# Patient Record
Sex: FEMALE | Race: WHITE | Employment: OTHER | URBAN - NONMETROPOLITAN AREA
[De-identification: names, ages, dates, MRNs, and addresses within clinical notes are randomized per-mention and may not be internally consistent; named-entity substitution may affect disease eponyms.]

---

## 2018-01-03 DIAGNOSIS — H93.19 TINNITUS, UNSPECIFIED LATERALITY: Primary | ICD-10-CM

## 2018-01-03 DIAGNOSIS — H92.01 RIGHT EAR PAIN: ICD-10-CM

## 2018-01-04 ENCOUNTER — OFFICE VISIT (OUTPATIENT)
Dept: ENT CLINIC | Age: 53
End: 2018-01-04
Payer: COMMERCIAL

## 2018-01-04 ENCOUNTER — HOSPITAL ENCOUNTER (OUTPATIENT)
Dept: AUDIOLOGY | Age: 53
Discharge: HOME | End: 2018-01-04
Payer: COMMERCIAL

## 2018-01-04 VITALS
DIASTOLIC BLOOD PRESSURE: 80 MMHG | BODY MASS INDEX: 17.23 KG/M2 | TEMPERATURE: 98.3 F | HEART RATE: 76 BPM | WEIGHT: 113.7 LBS | SYSTOLIC BLOOD PRESSURE: 102 MMHG | RESPIRATION RATE: 20 BRPM | HEIGHT: 68 IN

## 2018-01-04 DIAGNOSIS — H93.19 TINNITUS, UNSPECIFIED LATERALITY: Primary | ICD-10-CM

## 2018-01-04 PROCEDURE — 92557 COMPREHENSIVE HEARING TEST: CPT | Performed by: AUDIOLOGIST

## 2018-01-04 PROCEDURE — 92567 TYMPANOMETRY: CPT | Performed by: AUDIOLOGIST

## 2018-01-04 PROCEDURE — 99203 OFFICE O/P NEW LOW 30 MIN: CPT | Performed by: OTOLARYNGOLOGY

## 2018-01-04 ASSESSMENT — ENCOUNTER SYMPTOMS
BACK PAIN: 0
VOICE CHANGE: 0
CONSTIPATION: 0
BLOOD IN STOOL: 0
PHOTOPHOBIA: 0
WHEEZING: 0
ANAL BLEEDING: 0
RHINORRHEA: 0
EYE REDNESS: 0
STRIDOR: 0
TROUBLE SWALLOWING: 0
ABDOMINAL DISTENTION: 0
FACIAL SWELLING: 0
DIARRHEA: 0
EYE DISCHARGE: 0
COLOR CHANGE: 0
SINUS PRESSURE: 0
COUGH: 0
APNEA: 0
NAUSEA: 0
SORE THROAT: 0
RECTAL PAIN: 0
CHOKING: 0
SHORTNESS OF BREATH: 0
VOMITING: 0
EYE PAIN: 0
EYE ITCHING: 0
CHEST TIGHTNESS: 0
ABDOMINAL PAIN: 0

## 2020-08-26 ENCOUNTER — HOSPITAL ENCOUNTER (OUTPATIENT)
Dept: WOMENS IMAGING | Age: 55
Discharge: HOME | End: 2020-08-26

## 2020-09-01 ENCOUNTER — NURSE ONLY (OUTPATIENT)
Dept: LAB | Age: 55
End: 2020-09-01

## 2020-09-08 ENCOUNTER — OFFICE VISIT (OUTPATIENT)
Dept: ONCOLOGY | Age: 55
End: 2020-09-08
Payer: COMMERCIAL

## 2020-09-08 ENCOUNTER — HOSPITAL ENCOUNTER (OUTPATIENT)
Dept: INFUSION THERAPY | Age: 55
Discharge: HOME | End: 2020-09-08
Payer: COMMERCIAL

## 2020-09-08 VITALS
HEART RATE: 69 BPM | TEMPERATURE: 97.5 F | WEIGHT: 112.6 LBS | RESPIRATION RATE: 18 BRPM | SYSTOLIC BLOOD PRESSURE: 138 MMHG | OXYGEN SATURATION: 98 % | DIASTOLIC BLOOD PRESSURE: 67 MMHG | HEIGHT: 68 IN | BODY MASS INDEX: 17.06 KG/M2

## 2020-09-08 PROBLEM — Z51.11 ENCOUNTER FOR CHEMOTHERAPY MANAGEMENT: Status: ACTIVE | Noted: 2020-09-08

## 2020-09-08 PROBLEM — C50.411 MALIGNANT NEOPLASM OF UPPER-OUTER QUADRANT OF RIGHT FEMALE BREAST (HCC): Status: ACTIVE | Noted: 2020-09-08

## 2020-09-08 PROCEDURE — 99211 OFF/OP EST MAY X REQ PHY/QHP: CPT

## 2020-09-08 PROCEDURE — 99205 OFFICE O/P NEW HI 60 MIN: CPT | Performed by: INTERNAL MEDICINE

## 2020-09-08 RX ORDER — SUCRALFATE 1 G/1
1 TABLET ORAL 2 TIMES DAILY
COMMUNITY

## 2020-09-08 RX ORDER — OMEPRAZOLE 20 MG/1
20 CAPSULE, DELAYED RELEASE ORAL DAILY
COMMUNITY

## 2020-09-08 RX ORDER — ESCITALOPRAM OXALATE 10 MG/1
10 TABLET ORAL DAILY
COMMUNITY

## 2020-09-08 NOTE — PATIENT INSTRUCTIONS
1.  Consult Dr. Renetta Mora for Mediport placement for chemotherapy administration. Dr. Renetta Mora plans to do this Thursday at [facility]. 2.  Schedule chemotherapy teaching for Adriamycin/Cytoxan followed by Taxol. 3.  Pre-CERT chemotherapy treatment. 4.  Schedule first cycle of chemotherapy with laboratory studies on 09/16/2020.  5.  Return to clinic to see me on second cycle of chemotherapy treatment with labs. 6.  Schedule cardiac echo and MRI of the liver at [facility] in [facility]. These are not critical to be done prior to the first cycle of chemotherapy.

## 2020-09-09 RX ORDER — ONDANSETRON 4 MG/1
4 TABLET, FILM COATED ORAL EVERY 8 HOURS PRN
Qty: 30 TABLET | Refills: 5 | Status: SHIPPED | OUTPATIENT
Start: 2020-09-09 | End: 2021-06-18 | Stop reason: ALTCHOICE

## 2020-09-09 RX ORDER — LIDOCAINE AND PRILOCAINE 25; 25 MG/G; MG/G
CREAM TOPICAL
Qty: 1 TUBE | Refills: 5 | Status: SHIPPED | OUTPATIENT
Start: 2020-09-09

## 2020-09-09 NOTE — PROGRESS NOTES
New chemotherapy validation note:    Diagnosis for chemotherapy: Breast Ca    Regimen ordered: [facility] [place] [facility] Taxol    Reference or literature used for validation: NCCN     Date literature or guideline last updated 2019     Deviation from literature or guideline used: none    Summary of any verbal or telephone information obtained: n/a     Dean MARCUM. Ph.  9/9/2020  1:33 PM

## 2020-09-09 NOTE — PROGRESS NOTES
[facility] CENTER  [facility] NET[facility] OF [facility]  [facility] [address]  [address]  [address]  Dept: [phone]  Dept Fax: [phone]: [phone]     Referring Physician:  Dr. Tasia Bonner,    Thank you for your referral of this patient for evaluation of breast cancer. I appreciate your  trust of my care for your patients. I know that you know this patient's history well, but I will summarize for my records. In addition, my recommendations and plan of care are summarized below. Please call if you have any questions or if I can be of any further assistance to you or this patient. Julio Man:      Chief Complaint:  Francisca Sanchez is a 47 y. o. with breast cancer. HPI:  This is the first visit to the [facility] for this patient who was referred Dr. Shayla Bonner for evaluation of breast cancer. The patient is a 60-year-old postmenopausal female with breast cancer located in the right breast.  She was originally diagnosed with breast cancer in December 2009 also involving the right breast.  At that time she was noted to have an invasive ductal carcinoma that was estrogen receptor positive, progesterone separate positive, and HER-2/natty overexpression was negative. She underwent a lumpectomy of the right breast with sentinel node biopsy. Pathology confirmed the invasive ductal carcinoma and one sentinel lymph node was negative for malignancy. Her Oncotype DX score was 18 and therefore she did not receive adjuvant chemotherapy treatment. At the time of her initial diagnosis, she was premenopausal and therefore was placed on tamoxifen therapy. She completed a 5-year course of anti-estrogen therapy. In August 2020, the patient developed a palpable right axillary mass.   In context of this condition, the patient had a mammogram and ultrasound completed on August 11, 2020. This confirmed a 2.5 cm right axillary mass with a 1.5 cm adjacent lymph node. Other than the palpable abnormality the patient had no signs or symptoms associated with the mass or other evidence of breast cancer. A modifying factor affecting this condition is that on August 18, 2020 the patient had a biopsy of the axillary mass completed. This confirmed invasive ductal carcinoma which was as receptor positive, progestin receptor positive and HER-2/natty overexpression was negative by FISH. The Ki-67 was 30%. She was then seen by Dr. Harrison Sylvester at [facility]. CT scans of the chest abdomen and pelvis were completed as well as a bone scan. These did not find any definitive evidence of distant metastatic disease. There were several cyst and a hemangioma noted in the liver. On September 20 of 2020 a follow-up breast MRI was completed which found a much larger mass than previously noted which abutted and may involve the lateral and posterior borders of the pectoralis minor muscle. No left axillary or internal mammary chain lymphadenopathy was identified. The patient was reviewed at the breast cancer multimodality conference at [facility] and received a recommendation of receiving neoadjuvant chemotherapy with dose dense Adriamycin/Cytoxan followed by a taxane. The patient would like to have her treatment done closer to home as she lives in [facility] [place] [place] and therefore was referred to our office for chemotherapy administration. She still plans to have her surgical intervention completed at [facility]. Past Medical History  She  has a past medical history of Cancer ([address] and GERD (gastroesophageal reflux disease). Surgical History  She  has a past surgical history that includes Rotator cuff repair (Left); Appendectomy; and Breast lumpectomy (Right, 01/19/2010).     Home Medications  She has a current medication list which includes the following prescription(s): sucralfate, omeprazole, and escitalopram.    Allergies  Allergies   Allergen Reactions    Compazine [Prochlorperazine] Other (See Comments)     Had trouble swallowing      Social History  She  reports that she has never smoked. She has never used smokeless tobacco. She reports current alcohol use. She reports that she does not use drugs. Family History  Her family history includes Cancer in her mother. Review of Systems  Constitutional: Negative. HENT: Negative. Eyes: Negative. Respiratory: Negative. Cardiovascular: Negative. Gastrointestinal: Negative. Genitourinary: Negative. Musculoskeletal: Negative. Skin: Negative. Neurological: Negative. Hematological: Negative. Psychiatric/Behavioral: Negative. Objective:   Physical Exam  Vitals:    09/08/20 1231   BP: 138/67   Pulse: 69   Resp: 18   Temp: 97.5 °F (36.4 °C)   SpO2: 98%   Vitals reviewed and are stable. Constitutional: Well-developed and well-nourished. No acute distress. HENT: Normocephalic and atraumatic. Eyes: Pupils are equal and reactive. No scleral icterus. Neck: Overall appearance is symmetrical. No identifiable masses. Chest: Inspection and palpation of chest is normal.  Pulmonary: Effort normal. No respiratory distress. Cardiovascular: RRR. No edema in any of the four extremities. Abdominal: Soft. No hepatomegaly or splenomegaly. Musculoskeletal: Gait is normal. Muscle strength and tone good. Neurological: Alert and oriented to person, place, and time. Judgment and thought content normal.  Skin: Skin is warm and dry. No rash. Psychiatric: Mood and affect appropriate for the clinical situation. Behavior is normal.      Assessment:   1. Recurrent invasive ductal carcinoma of the right breast.  2.  Probable cyst and hemangioma involving the liver. 3.  Initial chemotherapy encounter. Recommendations:   1. Consult Dr. Wen Dewey for Mediport placement for chemotherapy administration.     2.  Schedule chemotherapy teaching for Adriamycin/Cytoxan followed by Taxol. 3.  Pre-CERT chemotherapy treatment for the above treatment. 4.  Schedule first cycle of chemotherapy with laboratory studies on 09/16/2020.  5.  Return to clinic to see me on second cycle of chemotherapy treatment with labs. 6.  Schedule cardiac echo and MRI of the [place] [facility] [place] [address] [place] [address]. Daisha Barbosa M.D. Medical Director: [facility]  Cancer Network [address] [employer], [address] [place] [address], [address] [place] [address], [address] [place] [address], [address] [place] [address] of the [facility] at the [facility]      **This report has been created using voice recognition software. It may contain minor errors which are inherent in voice recognition technology. **

## 2020-09-10 RX ORDER — HEPARIN SODIUM (PORCINE) LOCK FLUSH IV SOLN 100 UNIT/ML 100 UNIT/ML
500 SOLUTION INTRAVENOUS PRN
Status: CANCELLED | OUTPATIENT
Start: 2020-09-10

## 2020-09-10 RX ORDER — SODIUM CHLORIDE 0.9 % (FLUSH) 0.9 %
20 SYRINGE (ML) INJECTION PRN
Status: CANCELLED | OUTPATIENT
Start: 2020-09-10

## 2020-09-10 RX ORDER — SODIUM CHLORIDE 0.9 % (FLUSH) 0.9 %
10 SYRINGE (ML) INJECTION PRN
Status: CANCELLED | OUTPATIENT
Start: 2020-09-10

## 2020-09-11 RX ORDER — METHYLPREDNISOLONE SODIUM SUCCINATE 125 MG/2ML
125 INJECTION, POWDER, LYOPHILIZED, FOR SOLUTION INTRAMUSCULAR; INTRAVENOUS ONCE
Status: CANCELLED | OUTPATIENT
Start: 2020-09-16

## 2020-09-11 RX ORDER — SODIUM CHLORIDE 9 MG/ML
INJECTION, SOLUTION INTRAVENOUS CONTINUOUS
Status: CANCELLED | OUTPATIENT
Start: 2020-09-16

## 2020-09-11 RX ORDER — SODIUM CHLORIDE 0.9 % (FLUSH) 0.9 %
5 SYRINGE (ML) INJECTION PRN
Status: CANCELLED | OUTPATIENT
Start: 2020-09-16

## 2020-09-11 RX ORDER — DIPHENHYDRAMINE HYDROCHLORIDE 50 MG/ML
50 INJECTION INTRAMUSCULAR; INTRAVENOUS ONCE
Status: CANCELLED | OUTPATIENT
Start: 2020-09-16

## 2020-09-11 RX ORDER — HEPARIN SODIUM (PORCINE) LOCK FLUSH IV SOLN 100 UNIT/ML 100 UNIT/ML
500 SOLUTION INTRAVENOUS PRN
Status: CANCELLED | OUTPATIENT
Start: 2020-09-16

## 2020-09-11 RX ORDER — SODIUM CHLORIDE 9 MG/ML
20 INJECTION, SOLUTION INTRAVENOUS ONCE
Status: CANCELLED | OUTPATIENT
Start: 2020-09-16

## 2020-09-11 RX ORDER — SODIUM CHLORIDE 0.9 % (FLUSH) 0.9 %
10 SYRINGE (ML) INJECTION PRN
Status: CANCELLED | OUTPATIENT
Start: 2020-09-16

## 2020-09-11 RX ORDER — DOXORUBICIN HYDROCHLORIDE 2 MG/ML
60 INJECTION, SOLUTION INTRAVENOUS ONCE
Status: CANCELLED | OUTPATIENT
Start: 2020-09-16

## 2020-09-11 RX ORDER — PALONOSETRON 0.05 MG/ML
0.25 INJECTION, SOLUTION INTRAVENOUS ONCE
Status: CANCELLED | OUTPATIENT
Start: 2020-09-16

## 2020-09-14 ENCOUNTER — HOSPITAL ENCOUNTER (OUTPATIENT)
Dept: INFUSION THERAPY | Age: 55
Discharge: HOME | End: 2020-09-14
Payer: COMMERCIAL

## 2020-09-14 PROCEDURE — 99212 OFFICE O/P EST SF 10 MIN: CPT

## 2020-09-14 PROCEDURE — 99213 OFFICE O/P EST LOW 20 MIN: CPT

## 2020-09-14 NOTE — PROGRESS NOTES
STEVEN LEE here to see patient regaurding rash under left side of chest. Patient instructed to use only hydrocortisone cream, allow to dry after showering and to call if worse.

## 2020-09-14 NOTE — PLAN OF CARE
Problem: Musculor/Skeletal Functional Status  Goal: Absence of falls  Outcome: Met This Shift  Note: No falls this admission   Intervention: Fall precautions  Note: Patient aware of fall precautions for here and at home -call light in reach while here       Problem: Intellectual/Education/Knowledge Deficit  Goal: Teaching initiated upon admission  Outcome: Met This Shift  Note: Chemotherapy Teaching     What is Chemotherapy   Drug action [x]   Method of Administration [x]   Handouts given []     Side Effects  Nausea/vomiting [x]   Diarrhea [x]   Fatigue [x]   Signs / Symptoms of infection [x]   Neutropenia [x]   Thrombocytopenia [x]   Alopecia [x]   neuropathy [x]   Cumming diet &  the importance of fluids [x]       Micellaneous  Importance of nutrition [x]   Importance of oral hygiene [x]   When to call the MD [x]   Monitoring labs [x]   Use of supportive services []     Explanation of Drug Regimen / Frequency  Dose dense Adriamycin and cytoxin, neulasta and then taxol weekly      Comments  Verbalized understanding to drug,action,side effects and when to call MD      Goal: Written Disposition Instruction form completed  Outcome: Met This Shift  Note: Discharge instructions given and reviewed with patient. All questions answered. Patient verbalized understanding   Intervention: Verbal/written education provided  Note: Discharge home      Problem: Discharge Planning  Goal: Knowledge of discharge instructions  Description: Knowledge of discharge instructions  Outcome: Met This Shift  Note: Discharge instructions given and reviewed with patient. All questions answered. Patient verbalized understanding   Intervention: Interaction with patient/family and care team  Note: All questions and concerns addressed with patient   Intervention: Discharge to appropriate level of care  Note: Discharge home    Care plan reviewed with patient and .   Patient and   verbalize understanding of the plan of care and contribute to goal setting.

## 2020-09-14 NOTE — PROGRESS NOTES
Patient and  instructed on dose dense adriamycin and cytoxin and then week taxol and Pre-medications. Drug information sheets, side effects handouts, Understanding your blood counts, bland diet, importance of hydration, when to call physician sheet, reduce risk infection sheet, bleeding precaution, neutropenia precaution. All questions answered satisfactorily and support given. Patient given a tour of facility. Approximately 90  minutes spent with patient and .

## 2020-09-14 NOTE — PROGRESS NOTES
Pt discharged in stable condition with verbalization of discharge instructions all questions answered and all  belongings sent with patient.  Patient informed that will call with start date once insurance approval is approved

## 2020-09-16 ENCOUNTER — HOSPITAL ENCOUNTER (OUTPATIENT)
Dept: INFUSION THERAPY | Age: 55
Discharge: HOME | End: 2020-09-16
Payer: COMMERCIAL

## 2020-09-16 VITALS
DIASTOLIC BLOOD PRESSURE: 58 MMHG | OXYGEN SATURATION: 100 % | SYSTOLIC BLOOD PRESSURE: 106 MMHG | RESPIRATION RATE: 18 BRPM | HEIGHT: 68 IN | BODY MASS INDEX: 17.19 KG/M2 | WEIGHT: 113.4 LBS | TEMPERATURE: 97.4 F | HEART RATE: 61 BPM

## 2020-09-16 DIAGNOSIS — C50.411 MALIGNANT NEOPLASM OF UPPER-OUTER QUADRANT OF RIGHT FEMALE BREAST, UNSPECIFIED ESTROGEN RECEPTOR STATUS (HCC): ICD-10-CM

## 2020-09-16 DIAGNOSIS — Z17.0 MALIGNANT NEOPLASM OF UPPER-OUTER QUADRANT OF RIGHT BREAST IN FEMALE, ESTROGEN RECEPTOR POSITIVE (HCC): Primary | ICD-10-CM

## 2020-09-16 DIAGNOSIS — C50.411 MALIGNANT NEOPLASM OF UPPER-OUTER QUADRANT OF RIGHT BREAST IN FEMALE, ESTROGEN RECEPTOR POSITIVE (HCC): Primary | ICD-10-CM

## 2020-09-16 DIAGNOSIS — Z51.11 ENCOUNTER FOR CHEMOTHERAPY MANAGEMENT: ICD-10-CM

## 2020-09-16 LAB
ABSOLUTE IMMATURE GRANULOCYTE: 0.02 THOU/MM3 (ref 0–0.07)
BASINOPHIL, AUTOMATED: 0 % (ref 0–3)
BASOPHILS ABSOLUTE: 0 THOU/MM3 (ref 0–0.1)
CHLORIDE, WHOLE BLOOD: 104 MEQ/L (ref 98–109)
CREATININE, WHOLE BLOOD: 0.7 MG/DL (ref 0.5–1.2)
EOSINOPHILS ABSOLUTE: 0.1 THOU/MM3 (ref 0–0.4)
EOSINOPHILS RELATIVE PERCENT: 1 % (ref 0–4)
GFR, ESTIMATED ,CON: > 90 ML/MIN/1.73M2
GLUCOSE, WHOLE BLOOD: 85 MG/DL (ref 70–108)
HCT VFR BLD CALC: 39.4 % (ref 37–47)
HEMOGLOBIN: 13 GM/DL (ref 12–16)
IMMATURE GRANULOCYTES: 0 %
IONIZED CALCIUM, WHOLE BLOOD: 1.19 MMOL/L (ref 1.12–1.32)
LYMPHOCYTES # BLD: 37 % (ref 15–47)
LYMPHOCYTES ABSOLUTE: 2.3 THOU/MM3 (ref 1–4.8)
MCH RBC QN AUTO: 31 PG (ref 26–33)
MCHC RBC AUTO-ENTMCNC: 33 GM/DL (ref 32.2–35.5)
MCV RBC AUTO: 94 FL (ref 81–99)
MONOCYTES ABSOLUTE: 0.6 THOU/MM3 (ref 0.4–1.3)
MONOCYTES: 9 % (ref 0–12)
PDW BLD-RTO: 12.3 % (ref 11.5–14.5)
PLATELET # BLD: 283 THOU/MM3 (ref 130–400)
PMV BLD AUTO: 9.2 FL (ref 9.4–12.4)
POTASSIUM, WHOLE BLOOD: 4 MEQ/L (ref 3.5–4.9)
RBC # BLD: 4.2 MILL/MM3 (ref 4.2–5.4)
SEG NEUTROPHILS: 52 % (ref 43–75)
SEGMENTED NEUTROPHILS ABSOLUTE COUNT: 3.2 THOU/MM3 (ref 1.8–7.7)
SODIUM, WHOLE BLOOD: 142 MEQ/L (ref 138–146)
WBC # BLD: 6.2 THOU/MM3 (ref 4.8–10.8)

## 2020-09-16 PROCEDURE — 96411 CHEMO IV PUSH ADDL DRUG: CPT

## 2020-09-16 PROCEDURE — 96377 APPLICATON ON-BODY INJECTOR: CPT

## 2020-09-16 PROCEDURE — 6360000002 HC RX W HCPCS: Performed by: INTERNAL MEDICINE

## 2020-09-16 PROCEDURE — 36591 DRAW BLOOD OFF VENOUS DEVICE: CPT

## 2020-09-16 PROCEDURE — 96413 CHEMO IV INFUSION 1 HR: CPT

## 2020-09-16 PROCEDURE — 80076 HEPATIC FUNCTION PANEL: CPT

## 2020-09-16 PROCEDURE — 96375 TX/PRO/DX INJ NEW DRUG ADDON: CPT

## 2020-09-16 PROCEDURE — 96367 TX/PROPH/DG ADDL SEQ IV INF: CPT

## 2020-09-16 PROCEDURE — 84520 ASSAY OF UREA NITROGEN: CPT

## 2020-09-16 PROCEDURE — 2580000003 HC RX 258: Performed by: INTERNAL MEDICINE

## 2020-09-16 PROCEDURE — 82374 ASSAY BLOOD CARBON DIOXIDE: CPT

## 2020-09-16 PROCEDURE — 85025 COMPLETE CBC W/AUTO DIFF WBC: CPT

## 2020-09-16 PROCEDURE — 80047 BASIC METABLC PNL IONIZED CA: CPT

## 2020-09-16 RX ORDER — HEPARIN SODIUM (PORCINE) LOCK FLUSH IV SOLN 100 UNIT/ML 100 UNIT/ML
500 SOLUTION INTRAVENOUS PRN
Status: DISCONTINUED | OUTPATIENT
Start: 2020-09-16 | End: 2020-09-17 | Stop reason: HOSPADM

## 2020-09-16 RX ORDER — DOXORUBICIN HYDROCHLORIDE 2 MG/ML
60 INJECTION, SOLUTION INTRAVENOUS ONCE
Status: COMPLETED | OUTPATIENT
Start: 2020-09-16 | End: 2020-09-16

## 2020-09-16 RX ORDER — SODIUM CHLORIDE 9 MG/ML
20 INJECTION, SOLUTION INTRAVENOUS ONCE
Status: COMPLETED | OUTPATIENT
Start: 2020-09-16 | End: 2020-09-16

## 2020-09-16 RX ORDER — PALONOSETRON 0.05 MG/ML
0.25 INJECTION, SOLUTION INTRAVENOUS ONCE
Status: COMPLETED | OUTPATIENT
Start: 2020-09-16 | End: 2020-09-16

## 2020-09-16 RX ORDER — SODIUM CHLORIDE 0.9 % (FLUSH) 0.9 %
10 SYRINGE (ML) INJECTION PRN
Status: DISCONTINUED | OUTPATIENT
Start: 2020-09-16 | End: 2020-09-17 | Stop reason: HOSPADM

## 2020-09-16 RX ADMIN — Medication 10 ML: at 12:10

## 2020-09-16 RX ADMIN — FOSAPREPITANT 150 MG: 150 INJECTION, POWDER, LYOPHILIZED, FOR SOLUTION INTRAVENOUS at 12:42

## 2020-09-16 RX ADMIN — Medication 500 UNITS: at 14:46

## 2020-09-16 RX ADMIN — PALONOSETRON 0.25 MG: 0.05 INJECTION, SOLUTION INTRAVENOUS at 12:14

## 2020-09-16 RX ADMIN — DOXORUBICIN HYDROCHLORIDE 94 MG: 2 INJECTION, SOLUTION INTRAVENOUS at 13:18

## 2020-09-16 RX ADMIN — CYCLOPHOSPHAMIDE 940 MG: 1 INJECTION, POWDER, FOR SOLUTION INTRAVENOUS; ORAL at 13:40

## 2020-09-16 RX ADMIN — Medication 10 ML: at 11:45

## 2020-09-16 RX ADMIN — DEXAMETHASONE SODIUM PHOSPHATE 12 MG: 4 INJECTION, SOLUTION INTRAMUSCULAR; INTRAVENOUS at 12:19

## 2020-09-16 RX ADMIN — SODIUM CHLORIDE 20 ML/HR: 9 INJECTION, SOLUTION INTRAVENOUS at 12:10

## 2020-09-16 RX ADMIN — PEGFILGRASTIM 6 MG: KIT SUBCUTANEOUS at 14:46

## 2020-09-16 RX ADMIN — Medication 10 ML: at 14:46

## 2020-09-16 NOTE — PROGRESS NOTES
Adriamycin  given iv sidearm over 19 minutes into rapidly running iv of normal saline and good blood return through out.  Line flushed with normal saline

## 2020-09-16 NOTE — PLAN OF CARE
Problem: Infection - Central Venous Catheter-Associated Bloodstream Infection:  Goal: Will show no infection signs and symptoms  Description: Will show no infection signs and symptoms  Outcome: Met This Shift  Note: Mediport site with no redness or warmth. Skin over port intact with no signs of breakdown noted. Patient verbalizes signs/symptoms of port infection and when to notify the physician. Intervention: Infection risk assessment  Note: Discussed port maintenance, infection prevention, signs and when to call the doctor     Problem: Musculor/Skeletal Functional Status  Goal: Absence of falls  Outcome: Met This Shift  Note: Patient verbalizes understanding of fall precautions. Patient free from falls this visit. Intervention: Fall precautions  Note: Discussed fall precautions, call light within reach. Problem: Intellectual/Education/Knowledge Deficit  Goal: Teaching initiated upon admission  Outcome: Met This Shift  Note: Patient verbalizes understanding to verbal information given on adriamycin, cytoxan, and neulasta on pro,action and possible side effects. Aware to call MD if develop complications.    Intervention: Verbal/written education provided  Note: Chemotherapy Teaching     What is Chemotherapy   Drug action [x]   Method of Administration [x]   Handouts given []     Side Effects  Nausea/vomiting [x]   Diarrhea [x]   Fatigue [x]   Signs / Symptoms of infection [x]   Neutropenia [x]   Thrombocytopenia [x]   Alopecia [x]   neuropathy [x]   Stephens diet &  the importance of fluids [x]       Micellaneous  Importance of nutrition [x]   Importance of oral hygiene [x]   When to call the MD [x]   Monitoring labs [x]   Use of supportive services []     Explanation of Drug Regimen / Frequency  Adriamycin, cytoxan, and neulasta on pro     Comments  Verbalized understanding to drug,action,side effects and when to call MD        Problem: Discharge Planning  Goal: Knowledge of discharge instructions  Description: Knowledge of discharge instructions  Outcome: Met This Shift  Note: Verbalized understanding of discharge instructions, follow-up appointments, and when to call the physician. Intervention: Discharge to appropriate level of care  Note: Discuss discharge instructions, follow ups, and when to call the doctor. Care plan reviewed with patient and family. Patient and family verbalize understanding of the plan of care and contribute to goal setting.

## 2020-09-16 NOTE — PROGRESS NOTES
After approximately 27 hours, your On-body injector will beep to let you know your dose delivery will begin in 2 minutes. When the dose delivery starts it will take about 45 minutes to complete. During this time, the On-body injector will flash a green light. You may hear a series of clicks, this is Okay. A beep will sound when the dose delivery is complete. Check to see the status light is Solid Green or has switched off, this means dose is complete. Grab edge of adhesive pad. Slowly peel off the On-body injector and look for black line next to EMPTY indicator. Place into plastic disposable container. If during the administration of drug, the adhesive becomes wet or you notice dripping - call physician immediately.

## 2020-09-16 NOTE — PROGRESS NOTES
Chemotherapy Administration    Pre-assessment Data: Antineoplastic Agents  Other:   See toxicity flow sheet for assessment [x]     Physician Notification of Concerns Related to Chemotherapy Administration:   Physician Notified Fernando Chadwick / Time of Notification      Interventions:   Lab work assessed  [x]   Height / Weight verified for dose [x]   Current MAR reviewed [x]   Emergency drugs available as appropriate [x]   Anaphylaxis assessment completed [x]   Pre-medications administered as ordered [x]   Blood return noted upon initiation of chemotherapy [x]   Blood return noted each 1-2ml of a vesicant medication if given IV push [x]   Blood return noted each 2-3ml of a non-vesicant medication if given IV push []   Monitor for signs / symptoms of hypersensitivity reaction [x]   Chemotherapy orders (drug/dose/rate) verified by 2 Chemo certified RNs [x]   Monitor IV site and blood return throughout the infusion of the medication [x]   Document IV site checks on the IV assessment form [x]   Document chemotherapy teaching on the Patient Education tab [x]   Document patient verbalizes understanding of medications being administered [x]   If IV infiltration, see ONS Guidelines []   Other:      []

## 2020-09-16 NOTE — PROGRESS NOTES
Patient assessed for the following post chemotherapy:    Dizziness   No  Lightheadedness  No      Acute nausea/vomiting No  Headache   No  Chest pain/pressure  No  Rash/itching   No  Shortness of breath  No    Patient kept for 20 minutes observation post infusion chemotherapy. Patient tolerated chemotherapy treatment with adriamycin and cytoxin without any complications. Last vital signs:   BP (!) 106/58   Pulse 61   Temp 97.4 °F (36.3 °C) (Oral)   Resp 18   Ht 5' 8\" (1.727 m)   Wt 113 lb 6.4 oz (51.4 kg)   SpO2 100%   BMI 17.24 kg/m²       Patient instructed if experience any of the above symptoms following today's infusion,he is to notify MD immediately or go to the emergency department. Discharge instructions given to patient. Verbalizes understanding. Ambulated off unit with  with belongings.

## 2020-09-17 LAB
ALBUMIN SERPL-MCNC: 4.4 G/DL (ref 3.5–5.1)
ALP BLD-CCNC: 62 U/L (ref 38–126)
ALT SERPL-CCNC: 15 U/L (ref 11–66)
AST SERPL-CCNC: 25 U/L (ref 5–40)
BILIRUB SERPL-MCNC: 0.3 MG/DL (ref 0.3–1.2)
BILIRUBIN DIRECT: < 0.2 MG/DL (ref 0–0.3)
BUN BLDV-MCNC: 13 MG/DL (ref 7–22)
CO2: 28 MEQ/L (ref 23–33)
TOTAL PROTEIN: 7.3 G/DL (ref 6.1–8)

## 2020-09-29 RX ORDER — DIPHENHYDRAMINE HYDROCHLORIDE 50 MG/ML
50 INJECTION INTRAMUSCULAR; INTRAVENOUS ONCE
Status: CANCELLED | OUTPATIENT
Start: 2020-09-30

## 2020-09-29 RX ORDER — SODIUM CHLORIDE 9 MG/ML
INJECTION, SOLUTION INTRAVENOUS CONTINUOUS
Status: CANCELLED | OUTPATIENT
Start: 2020-09-30

## 2020-09-29 RX ORDER — METHYLPREDNISOLONE SODIUM SUCCINATE 125 MG/2ML
125 INJECTION, POWDER, LYOPHILIZED, FOR SOLUTION INTRAMUSCULAR; INTRAVENOUS ONCE
Status: CANCELLED | OUTPATIENT
Start: 2020-09-30

## 2020-09-29 RX ORDER — SODIUM CHLORIDE 0.9 % (FLUSH) 0.9 %
5 SYRINGE (ML) INJECTION PRN
Status: CANCELLED | OUTPATIENT
Start: 2020-09-30

## 2020-09-30 ENCOUNTER — HOSPITAL ENCOUNTER (OUTPATIENT)
Dept: INFUSION THERAPY | Age: 55
Discharge: HOME | End: 2020-09-30
Payer: COMMERCIAL

## 2020-09-30 ENCOUNTER — OFFICE VISIT (OUTPATIENT)
Dept: ONCOLOGY | Age: 55
End: 2020-09-30
Payer: COMMERCIAL

## 2020-09-30 VITALS
OXYGEN SATURATION: 99 % | WEIGHT: 115 LBS | SYSTOLIC BLOOD PRESSURE: 113 MMHG | BODY MASS INDEX: 17.49 KG/M2 | HEART RATE: 77 BPM | DIASTOLIC BLOOD PRESSURE: 68 MMHG | RESPIRATION RATE: 18 BRPM

## 2020-09-30 VITALS
OXYGEN SATURATION: 100 % | TEMPERATURE: 97.6 F | DIASTOLIC BLOOD PRESSURE: 55 MMHG | WEIGHT: 115 LBS | HEART RATE: 65 BPM | RESPIRATION RATE: 18 BRPM | BODY MASS INDEX: 17.43 KG/M2 | HEIGHT: 68 IN | SYSTOLIC BLOOD PRESSURE: 113 MMHG

## 2020-09-30 DIAGNOSIS — Z51.11 ENCOUNTER FOR CHEMOTHERAPY MANAGEMENT: ICD-10-CM

## 2020-09-30 DIAGNOSIS — C50.411 MALIGNANT NEOPLASM OF UPPER-OUTER QUADRANT OF RIGHT BREAST IN FEMALE, ESTROGEN RECEPTOR POSITIVE (HCC): Primary | ICD-10-CM

## 2020-09-30 DIAGNOSIS — Z17.0 MALIGNANT NEOPLASM OF UPPER-OUTER QUADRANT OF RIGHT BREAST IN FEMALE, ESTROGEN RECEPTOR POSITIVE (HCC): Primary | ICD-10-CM

## 2020-09-30 DIAGNOSIS — C50.411 MALIGNANT NEOPLASM OF UPPER-OUTER QUADRANT OF RIGHT FEMALE BREAST, UNSPECIFIED ESTROGEN RECEPTOR STATUS (HCC): ICD-10-CM

## 2020-09-30 LAB
CHLORIDE, WHOLE BLOOD: 104 MEQ/L (ref 98–109)
CREATININE, WHOLE BLOOD: 0.7 MG/DL (ref 0.5–1.2)
GFR, ESTIMATED ,CON: > 90 ML/MIN/1.73M2
GLUCOSE, WHOLE BLOOD: 81 MG/DL (ref 70–108)
IONIZED CALCIUM, WHOLE BLOOD: 1.15 MMOL/L (ref 1.12–1.32)
POTASSIUM, WHOLE BLOOD: 4.2 MEQ/L (ref 3.5–4.9)
SODIUM, WHOLE BLOOD: 141 MEQ/L (ref 138–146)

## 2020-09-30 PROCEDURE — 82374 ASSAY BLOOD CARBON DIOXIDE: CPT

## 2020-09-30 PROCEDURE — 99211 OFF/OP EST MAY X REQ PHY/QHP: CPT

## 2020-09-30 PROCEDURE — 2580000003 HC RX 258: Performed by: INTERNAL MEDICINE

## 2020-09-30 PROCEDURE — 99215 OFFICE O/P EST HI 40 MIN: CPT | Performed by: INTERNAL MEDICINE

## 2020-09-30 PROCEDURE — 96375 TX/PRO/DX INJ NEW DRUG ADDON: CPT

## 2020-09-30 PROCEDURE — 85025 COMPLETE CBC W/AUTO DIFF WBC: CPT

## 2020-09-30 PROCEDURE — 36591 DRAW BLOOD OFF VENOUS DEVICE: CPT

## 2020-09-30 PROCEDURE — 96367 TX/PROPH/DG ADDL SEQ IV INF: CPT

## 2020-09-30 PROCEDURE — 96411 CHEMO IV PUSH ADDL DRUG: CPT

## 2020-09-30 PROCEDURE — 80047 BASIC METABLC PNL IONIZED CA: CPT

## 2020-09-30 PROCEDURE — 96377 APPLICATON ON-BODY INJECTOR: CPT

## 2020-09-30 PROCEDURE — 84520 ASSAY OF UREA NITROGEN: CPT

## 2020-09-30 PROCEDURE — 80076 HEPATIC FUNCTION PANEL: CPT

## 2020-09-30 PROCEDURE — 96413 CHEMO IV INFUSION 1 HR: CPT

## 2020-09-30 PROCEDURE — 6360000002 HC RX W HCPCS: Performed by: INTERNAL MEDICINE

## 2020-09-30 RX ORDER — SODIUM CHLORIDE 0.9 % (FLUSH) 0.9 %
10 SYRINGE (ML) INJECTION PRN
Status: DISCONTINUED | OUTPATIENT
Start: 2020-09-30 | End: 2020-10-01 | Stop reason: HOSPADM

## 2020-09-30 RX ORDER — SODIUM CHLORIDE 9 MG/ML
20 INJECTION, SOLUTION INTRAVENOUS ONCE
Status: COMPLETED | OUTPATIENT
Start: 2020-09-30 | End: 2020-09-30

## 2020-09-30 RX ORDER — PROMETHAZINE HYDROCHLORIDE 25 MG/1
25 TABLET ORAL EVERY 6 HOURS PRN
Qty: 30 TABLET | Refills: 3 | Status: SHIPPED | OUTPATIENT
Start: 2020-09-30 | End: 2021-06-18 | Stop reason: ALTCHOICE

## 2020-09-30 RX ORDER — DOXORUBICIN HYDROCHLORIDE 2 MG/ML
60 INJECTION, SOLUTION INTRAVENOUS ONCE
Status: COMPLETED | OUTPATIENT
Start: 2020-09-30 | End: 2020-09-30

## 2020-09-30 RX ORDER — PALONOSETRON 0.05 MG/ML
0.25 INJECTION, SOLUTION INTRAVENOUS ONCE
Status: COMPLETED | OUTPATIENT
Start: 2020-09-30 | End: 2020-09-30

## 2020-09-30 RX ORDER — PROMETHAZINE HYDROCHLORIDE 25 MG/1
25 TABLET ORAL EVERY 6 HOURS PRN
Qty: 30 TABLET | Refills: 3 | Status: SHIPPED | OUTPATIENT
Start: 2020-09-30 | End: 2020-09-30 | Stop reason: SDUPTHER

## 2020-09-30 RX ORDER — HEPARIN SODIUM (PORCINE) LOCK FLUSH IV SOLN 100 UNIT/ML 100 UNIT/ML
500 SOLUTION INTRAVENOUS PRN
Status: DISCONTINUED | OUTPATIENT
Start: 2020-09-30 | End: 2020-10-01 | Stop reason: HOSPADM

## 2020-09-30 RX ADMIN — DOXORUBICIN HYDROCHLORIDE 94 MG: 2 INJECTION, SOLUTION INTRAVENOUS at 13:21

## 2020-09-30 RX ADMIN — PEGFILGRASTIM 6 MG: KIT SUBCUTANEOUS at 14:19

## 2020-09-30 RX ADMIN — FOSAPREPITANT 150 MG: 150 INJECTION, POWDER, LYOPHILIZED, FOR SOLUTION INTRAVENOUS at 12:38

## 2020-09-30 RX ADMIN — PALONOSETRON 0.25 MG: 0.05 INJECTION, SOLUTION INTRAVENOUS at 12:14

## 2020-09-30 RX ADMIN — Medication 10 ML: at 12:11

## 2020-09-30 RX ADMIN — SODIUM CHLORIDE 20 ML/HR: 9 INJECTION, SOLUTION INTRAVENOUS at 12:11

## 2020-09-30 RX ADMIN — CYCLOPHOSPHAMIDE 940 MG: 1 INJECTION, POWDER, FOR SOLUTION INTRAVENOUS; ORAL at 13:38

## 2020-09-30 RX ADMIN — Medication 500 UNITS: at 14:35

## 2020-09-30 RX ADMIN — Medication 10 ML: at 14:35

## 2020-09-30 RX ADMIN — Medication 10 ML: at 11:30

## 2020-09-30 RX ADMIN — DEXAMETHASONE SODIUM PHOSPHATE 12 MG: 4 INJECTION, SOLUTION INTRAMUSCULAR; INTRAVENOUS at 12:17

## 2020-09-30 NOTE — PLAN OF CARE
Care plan reviewed with patient. Patient verbalized understanding of the plan of care and contribute to goal setting. Problem: Intellectual/Education/Knowledge Deficit  Goal: Teaching initiated upon admission  Outcome: Met This Shift  Note: Patient verbalizes understanding to verbal information given on cytoxan and adriamycin,action and possible side effects. Aware to call MD if develop complications. Intervention: Verbal/written education provided  Note: Chemotherapy Teaching     What is Chemotherapy   Drug action [x]   Method of Administration [x]   Handouts given []     Side Effects  Nausea/vomiting [x]   Diarrhea [x]   Fatigue [x]   Signs / Symptoms of infection [x]   Neutropenia [x]   Thrombocytopenia [x]   Alopecia [x]   neuropathy [x]   Chaplin diet &  the importance of fluids [x]       Micellaneous  Importance of nutrition [x]   Importance of oral hygiene [x]   When to call the MD [x]   Monitoring labs [x]   Use of supportive services []     Explanation of Drug Regimen / Frequency  Adriamycin and cytoxan     Comments  Verbalized understanding to drug,action,side effects and when to call MD         Problem: Discharge Planning  Goal: Knowledge of discharge instructions  Description: Knowledge of discharge instructions  Outcome: Met This Shift  Note: Verbalized understanding of discharge instructions, follow ups and when to call doctor   Intervention: Discharge to appropriate level of care  Note: Discuss discharge instructions, follow ups and when to call doctor. Problem: Falls - Risk of:  Goal: Will remain free from falls  Description: Will remain free from falls  Outcome: Met This Shift  Note: Free from falls while in infusion center.  Verbalized understanding of fall prevention and to ask for assistance with ambulation   Intervention: Assess risk factors for falls  Description: Assess risk factors for falls  Note: Assess for fall risk, instruct to ask for assistance with ambulation      Problem: Infection - Central Venous Catheter-Associated Bloodstream Infection:  Goal: Will show no infection signs and symptoms  Description: Will show no infection signs and symptoms  Outcome: Met This Shift  Note: Mediport site with no redness or warmth. Skin over port intact with no signs of breakdown noted. Patient verbalizes signs/symptoms of port infection and when to notify the physician.     Intervention: Infection risk assessment  Description: Infection risk assessment  Note: Discuss port maintenance, infection prevention, signs and when to call

## 2020-09-30 NOTE — PROGRESS NOTES
Chemotherapy Administration    Pre-assessment Data: Antineoplastic Agents  Other:   See toxicity flow sheet for assessment [x]     Physician Notification of Concerns Related to Chemotherapy Administration:   Physician Notified Marina Espinoza / Time of Notification      Interventions:   Lab work assessed  [x]   Height / Weight verified for dose [x]   Current MAR reviewed [x]   Emergency drugs available as appropriate [x]   Anaphylaxis assessment completed [x]   Pre-medications administered as ordered [x]   Blood return noted upon initiation of chemotherapy [x]   Blood return noted each 1-2ml of a vesicant medication if given IV push [x]   Blood return noted each 2-3ml of a non-vesicant medication if given IV push []   Monitor for signs / symptoms of hypersensitivity reaction [x]   Chemotherapy orders (drug/dose/rate) verified by 2 Chemo certified RNs [x]   Monitor IV site and blood return throughout the infusion of the medication [x]   Document IV site checks on the IV assessment form [x]   Document chemotherapy teaching on the Patient Education tab [x]   Document patient verbalizes understanding of medications being administered [x]   If IV infiltration, see ONS Guidelines []   Other:      []

## 2020-09-30 NOTE — PROGRESS NOTES
[facility] CANCER CENTER  CANCER NETWORK OF [facility]  [facility] [address] [facility]  [address]  [address]  Dept: [phone]  Dept Fax: [phone]: [phone]     Referring Physician:  Dr. Tasia Alberto    Subjective:      Chief Complaint:  Francisca Sanchez is a 54 y.o. with breast cancer. The patient is a 60-year-old postmenopausal female with breast cancer located in the right breast.  She was originally diagnosed with breast cancer in December 2009 also involving the right breast.  At that time she was noted to have an invasive ductal carcinoma that was estrogen receptor positive, progesterone separate positive, and HER-2/natty overexpression was negative. She underwent a lumpectomy of the right breast with sentinel node biopsy. Pathology confirmed the invasive ductal carcinoma and one sentinel lymph node was negative for malignancy. Her Oncotype DX score was 18 and therefore she did not receive adjuvant chemotherapy treatment. At the time of her initial diagnosis, she was premenopausal and therefore was placed on tamoxifen therapy. She completed a 5-year course of anti-estrogen therapy. In August 2020, the patient developed a palpable right axillary mass. In context of this condition, the patient had a mammogram and ultrasound completed on August 11, 2020. This confirmed a 2.5 cm right axillary mass with a 1.5 cm adjacent lymph node. Other than the palpable abnormality the patient had no signs or symptoms associated with the mass or other evidence of breast cancer. A modifying factor affecting this condition is that on August 18, 2020 the patient had a biopsy of the axillary mass completed. This confirmed invasive ductal carcinoma which was as receptor positive, progestin receptor positive and HER-2/natty overexpression was negative by FISH. The Ki-67 was 30%.   She was then seen by Dr. Shayla Bonner at [facility]. CT scans of the chest abdomen and pelvis were completed as well as a bone scan. These did not find any definitive evidence of distant metastatic disease. There were several cyst and a hemangioma noted in the liver. On September 20 of 2020 a follow-up breast MRI was completed which found a much larger mass than previously noted which abutted and may involve the lateral and posterior borders of the pectoralis minor muscle. No left axillary or internal mammary chain lymphadenopathy was identified. The patient was reviewed at the breast cancer multimodality conference at [facility] and received a recommendation of receiving neoadjuvant chemotherapy with dose dense Adriamycin/Cytoxan followed by a taxane. HPI:   Susana Alonso returns today for follow-up regarding her history of breast cancer and consideration of further chemotherapy treatment. Two weeks ago she received her first dose of Adriamycin Cytoxan therapy. Overall she tolerated this fairly well without any severe side effects or toxicity. The patient did develop a headache that she relates to the use of Zofran for nausea. Therefore, we will discontinue use of Zofran and will try Phenergan for antinausea therapy at home. She did experience a couple of days of malaise and anorexia but did not have any severe nausea or vomiting. The patient used on pro-for Neulasta injection which she tolerated well. She did not develop any specific aches or muscle cramping related to the use of Neulasta. Susana Alonso has not had fever, cough, shortness of breath or other signs of infection. Both her bowel and bladder habits have been fairly stable. Her white blood cell count is elevated today which is most likely related to the use of Neulasta. She does not have any specific signs of infection. The patient denies skeletal pain. She is active and remains with an ECOG performance status level 0.   The patient did have an MRI of the abdomen completed to evaluate liver lesions. These lesions just appeared to be cyst and hemangiomas with no clear evidence of malignancy. The results were reviewed with the patient today. A cardiac echo was also obtained for pre chemotherapy evaluation and her left ventricular ejection fraction was 60%. This was also reviewed with the patient today. PMH, SH, and FH:  I reviewed the patients medication list and allergy list as noted on the electronic medical record. The PMH, SH and FH were also reviewed as noted on the EMR. Review of Systems  Constitutional: Negative. HENT: Negative. Eyes: Negative. Respiratory: Negative. Cardiovascular: Negative. Gastrointestinal: Nausea. Genitourinary: Negative. Musculoskeletal: Negative. Skin: Negative. Neurological: Headaches. Hematological: Negative. Psychiatric/Behavioral: Negative. Objective:   Physical Exam  Vitals:    09/30/20 1216   BP: (!) 113/55   Pulse: 65   Resp: 18   Temp: 97.6 °F (36.4 °C)   SpO2: 100%   Vitals reviewed and are stable. Constitutional: Well-developed and well-nourished. No acute distress. HENT: Normocephalic and atraumatic. Eyes: Pupils are equal and reactive. No scleral icterus. Neck: Overall appearance is symmetrical. No identifiable masses. Pulmonary: Effort normal. No respiratory distress. Cardiovascular: RRR. No edema in any of the four extremities. Musculoskeletal: Gait is normal. Muscle strength and tone good. Neurological: Alert and oriented to person, place, and time. Judgment and thought content normal.  Skin: Skin is warm and dry. No rash. Psychiatric: Mood and affect appropriate for the clinical situation. Behavior is normal.      Assessment:   1. Recurrent invasive ductal carcinoma of the right breast.  2.  Leukocytosis. 3.  Oral Phenergan for nausea control at home. 4.  Chemotherapy encounter. Recommendations:   1. Proceed with cycle #2 of Adriamycin and Cytoxan combination chemotherapy.   2.  Monitor total WBC count and for signs of infection/fever. 3.  Monitor for side effects and toxicity from chemotherapy. 4.  Monitor for response of malignancy. Carlitos Mike M.D. Medical Director: [facility]  Cancer [place] [address], [address] [place] [address], [address] [place] [address], [address] [place] [address] of the [facility] at [facility]      **This report has been created using voice recognition software. It may contain minor errors which are inherent in voice recognition technology. **

## 2020-09-30 NOTE — PROGRESS NOTES
Patient assessed for the following post chemotherapy:    Dizziness   No  Lightheadedness  No      Acute nausea/vomiting No  Headache   No  Chest pain/pressure  No  Rash/itching   No  Shortness of breath  No    Patient kept for 20 minutes observation post infusion chemotherapy. Patient tolerated chemotherapy treatment with adriamycin and cytoxin  without any complications. Last vital signs:   /68   Pulse 77   Resp 18   Wt 115 lb (52.2 kg)   SpO2 99%   BMI 17.49 kg/m²     Patient instructed if experience any of the above symptoms following today's infusion,she is to notify MD immediately or go to the emergency department. Discharge instructions given to patient. Verbalizes understanding. Ambulated off unit with   with belongings.

## 2020-09-30 NOTE — PROGRESS NOTES
5fu given iv sidearm over 15 minutes into rapidly running iv of normal saline and good blood return through out.  Line flushed with normal saline

## 2020-10-01 LAB
ALBUMIN SERPL-MCNC: 4.3 G/DL (ref 3.5–5.1)
ALP BLD-CCNC: 102 U/L (ref 38–126)
ALT SERPL-CCNC: 18 U/L (ref 11–66)
AST SERPL-CCNC: 22 U/L (ref 5–40)
BASOPHILS # BLD: 0.3 %
BASOPHILS ABSOLUTE: 0.1 THOU/MM3 (ref 0–0.1)
BILIRUB SERPL-MCNC: < 0.2 MG/DL (ref 0.3–1.2)
BILIRUBIN DIRECT: < 0.2 MG/DL (ref 0–0.3)
BUN BLDV-MCNC: 13 MG/DL (ref 7–22)
CO2: 25 MEQ/L (ref 23–33)
EOSINOPHIL # BLD: 0.1 %
EOSINOPHILS ABSOLUTE: 0 THOU/MM3 (ref 0–0.4)
ERYTHROCYTE [DISTWIDTH] IN BLOOD BY AUTOMATED COUNT: 12.4 % (ref 11.5–14.5)
ERYTHROCYTE [DISTWIDTH] IN BLOOD BY AUTOMATED COUNT: 45.9 FL (ref 35–45)
HCT VFR BLD CALC: 39.7 % (ref 37–47)
HEMOGLOBIN: 12.4 GM/DL (ref 12–16)
IMMATURE GRANS (ABS): 3.28 THOU/MM3 (ref 0–0.07)
IMMATURE GRANULOCYTES: 16.2 %
LYMPHOCYTES # BLD: 13.1 %
LYMPHOCYTES ABSOLUTE: 2.6 THOU/MM3 (ref 1–4.8)
MCH RBC QN AUTO: 31.6 PG (ref 26–33)
MCHC RBC AUTO-ENTMCNC: 31.2 GM/DL (ref 32.2–35.5)
MCV RBC AUTO: 101 FL (ref 81–99)
MONOCYTES # BLD: 9.7 %
MONOCYTES ABSOLUTE: 2 THOU/MM3 (ref 0.4–1.3)
NUCLEATED RED BLOOD CELLS: 0 /100 WBC
PATHOLOGIST REVIEW: ABNORMAL
PLATELET # BLD: 237 THOU/MM3 (ref 130–400)
PMV BLD AUTO: 10.1 FL (ref 9.4–12.4)
RBC # BLD: 3.93 MILL/MM3 (ref 4.2–5.4)
SEG NEUTROPHILS: 60.6 %
SEGMENTED NEUTROPHILS ABSOLUTE COUNT: 12.2 THOU/MM3 (ref 1.8–7.7)
TOTAL PROTEIN: 7.1 G/DL (ref 6.1–8)
WBC # BLD: 20.2 THOU/MM3 (ref 4.8–10.8)

## 2020-10-13 RX ORDER — SODIUM CHLORIDE 9 MG/ML
INJECTION, SOLUTION INTRAVENOUS CONTINUOUS
Status: CANCELLED | OUTPATIENT
Start: 2020-10-14

## 2020-10-13 RX ORDER — DIPHENHYDRAMINE HYDROCHLORIDE 50 MG/ML
50 INJECTION INTRAMUSCULAR; INTRAVENOUS ONCE
Status: CANCELLED | OUTPATIENT
Start: 2020-10-14

## 2020-10-13 RX ORDER — SODIUM CHLORIDE 0.9 % (FLUSH) 0.9 %
5 SYRINGE (ML) INJECTION PRN
Status: CANCELLED | OUTPATIENT
Start: 2020-10-14

## 2020-10-13 RX ORDER — METHYLPREDNISOLONE SODIUM SUCCINATE 125 MG/2ML
125 INJECTION, POWDER, LYOPHILIZED, FOR SOLUTION INTRAMUSCULAR; INTRAVENOUS ONCE
Status: CANCELLED | OUTPATIENT
Start: 2020-10-14

## 2020-10-14 ENCOUNTER — HOSPITAL ENCOUNTER (OUTPATIENT)
Dept: INFUSION THERAPY | Age: 55
Discharge: HOME | End: 2020-10-14
Payer: COMMERCIAL

## 2020-10-14 VITALS
HEIGHT: 68 IN | OXYGEN SATURATION: 98 % | BODY MASS INDEX: 17.37 KG/M2 | WEIGHT: 114.6 LBS | RESPIRATION RATE: 16 BRPM | SYSTOLIC BLOOD PRESSURE: 105 MMHG | TEMPERATURE: 98.1 F | DIASTOLIC BLOOD PRESSURE: 60 MMHG | HEART RATE: 67 BPM

## 2020-10-14 DIAGNOSIS — C50.411 MALIGNANT NEOPLASM OF UPPER-OUTER QUADRANT OF RIGHT FEMALE BREAST, UNSPECIFIED ESTROGEN RECEPTOR STATUS (HCC): Primary | ICD-10-CM

## 2020-10-14 DIAGNOSIS — Z51.11 ENCOUNTER FOR CHEMOTHERAPY MANAGEMENT: ICD-10-CM

## 2020-10-14 LAB
BASOPHILS # BLD: 0.5 %
BASOPHILS ABSOLUTE: 0.1 THOU/MM3 (ref 0–0.1)
BUN, WHOLE BLOOD: 14 MG/DL (ref 8–26)
CHLORIDE, WHOLE BLOOD: 103 MEQ/L (ref 98–109)
CREATININE, WHOLE BLOOD: 0.7 MG/DL (ref 0.5–1.2)
EOSINOPHIL # BLD: 0 %
EOSINOPHILS ABSOLUTE: 0 THOU/MM3 (ref 0–0.4)
ERYTHROCYTE [DISTWIDTH] IN BLOOD BY AUTOMATED COUNT: 13.2 % (ref 11.5–14.5)
ERYTHROCYTE [DISTWIDTH] IN BLOOD BY AUTOMATED COUNT: 47.3 FL (ref 35–45)
GFR, ESTIMATED ,CON: > 90 ML/MIN/1.73M2
GLUCOSE, WHOLE BLOOD: 85 MG/DL (ref 70–108)
HCT VFR BLD CALC: 36.1 % (ref 37–47)
HEMOGLOBIN: 11.2 GM/DL (ref 12–16)
IMMATURE GRANS (ABS): 4.66 THOU/MM3 (ref 0–0.07)
IMMATURE GRANULOCYTES: 18.8 %
IONIZED CALCIUM, WHOLE BLOOD: 1.15 MMOL/L (ref 1.12–1.32)
LYMPHOCYTES # BLD: 8.2 %
LYMPHOCYTES ABSOLUTE: 2 THOU/MM3 (ref 1–4.8)
MCH RBC QN AUTO: 31.6 PG (ref 26–33)
MCHC RBC AUTO-ENTMCNC: 31 GM/DL (ref 32.2–35.5)
MCV RBC AUTO: 102 FL (ref 81–99)
MONOCYTES # BLD: 7.1 %
MONOCYTES ABSOLUTE: 1.8 THOU/MM3 (ref 0.4–1.3)
NUCLEATED RED BLOOD CELLS: 1 /100 WBC
PLATELET # BLD: 236 THOU/MM3 (ref 130–400)
PMV BLD AUTO: 9.3 FL (ref 9.4–12.4)
POTASSIUM, WHOLE BLOOD: 4 MEQ/L (ref 3.5–4.9)
RBC # BLD: 3.54 MILL/MM3 (ref 4.2–5.4)
SEG NEUTROPHILS: 65.4 %
SEGMENTED NEUTROPHILS ABSOLUTE COUNT: 16.2 THOU/MM3 (ref 1.8–7.7)
SODIUM, WHOLE BLOOD: 140 MEQ/L (ref 138–146)
TOTAL CO2, WHOLE BLOOD: 29 MEQ/L (ref 23–33)
WBC # BLD: 24.7 THOU/MM3 (ref 4.8–10.8)

## 2020-10-14 PROCEDURE — 6360000002 HC RX W HCPCS: Performed by: INTERNAL MEDICINE

## 2020-10-14 PROCEDURE — 96375 TX/PRO/DX INJ NEW DRUG ADDON: CPT

## 2020-10-14 PROCEDURE — 96411 CHEMO IV PUSH ADDL DRUG: CPT

## 2020-10-14 PROCEDURE — 96367 TX/PROPH/DG ADDL SEQ IV INF: CPT

## 2020-10-14 PROCEDURE — 99211 OFF/OP EST MAY X REQ PHY/QHP: CPT

## 2020-10-14 PROCEDURE — 96377 APPLICATON ON-BODY INJECTOR: CPT

## 2020-10-14 PROCEDURE — 85025 COMPLETE CBC W/AUTO DIFF WBC: CPT

## 2020-10-14 PROCEDURE — 80076 HEPATIC FUNCTION PANEL: CPT

## 2020-10-14 PROCEDURE — 80047 BASIC METABLC PNL IONIZED CA: CPT

## 2020-10-14 PROCEDURE — 36591 DRAW BLOOD OFF VENOUS DEVICE: CPT

## 2020-10-14 PROCEDURE — 96413 CHEMO IV INFUSION 1 HR: CPT

## 2020-10-14 PROCEDURE — 2580000003 HC RX 258: Performed by: INTERNAL MEDICINE

## 2020-10-14 RX ORDER — SODIUM CHLORIDE 9 MG/ML
20 INJECTION, SOLUTION INTRAVENOUS ONCE
Status: COMPLETED | OUTPATIENT
Start: 2020-10-14 | End: 2020-10-14

## 2020-10-14 RX ORDER — SODIUM CHLORIDE 0.9 % (FLUSH) 0.9 %
10 SYRINGE (ML) INJECTION PRN
Status: DISCONTINUED | OUTPATIENT
Start: 2020-10-14 | End: 2020-10-15 | Stop reason: HOSPADM

## 2020-10-14 RX ORDER — DOXORUBICIN HYDROCHLORIDE 2 MG/ML
60 INJECTION, SOLUTION INTRAVENOUS ONCE
Status: COMPLETED | OUTPATIENT
Start: 2020-10-14 | End: 2020-10-14

## 2020-10-14 RX ORDER — HEPARIN SODIUM (PORCINE) LOCK FLUSH IV SOLN 100 UNIT/ML 100 UNIT/ML
500 SOLUTION INTRAVENOUS PRN
Status: DISCONTINUED | OUTPATIENT
Start: 2020-10-14 | End: 2020-10-15 | Stop reason: HOSPADM

## 2020-10-14 RX ORDER — PALONOSETRON 0.05 MG/ML
0.25 INJECTION, SOLUTION INTRAVENOUS ONCE
Status: COMPLETED | OUTPATIENT
Start: 2020-10-14 | End: 2020-10-14

## 2020-10-14 RX ADMIN — Medication 10 ML: at 12:06

## 2020-10-14 RX ADMIN — Medication 500 UNITS: at 15:05

## 2020-10-14 RX ADMIN — Medication 10 ML: at 12:05

## 2020-10-14 RX ADMIN — SODIUM CHLORIDE 20 ML/HR: 9 INJECTION, SOLUTION INTRAVENOUS at 12:53

## 2020-10-14 RX ADMIN — CYCLOPHOSPHAMIDE 940 MG: 1 INJECTION, POWDER, FOR SOLUTION INTRAVENOUS; ORAL at 14:15

## 2020-10-14 RX ADMIN — PALONOSETRON 0.25 MG: 0.05 INJECTION, SOLUTION INTRAVENOUS at 12:54

## 2020-10-14 RX ADMIN — Medication 10 ML: at 15:05

## 2020-10-14 RX ADMIN — Medication 10 ML: at 12:53

## 2020-10-14 RX ADMIN — PEGFILGRASTIM 6 MG: KIT SUBCUTANEOUS at 15:02

## 2020-10-14 RX ADMIN — FOSAPREPITANT 150 MG: 150 INJECTION, POWDER, LYOPHILIZED, FOR SOLUTION INTRAVENOUS at 13:16

## 2020-10-14 RX ADMIN — DEXAMETHASONE SODIUM PHOSPHATE 12 MG: 4 INJECTION, SOLUTION INTRAMUSCULAR; INTRAVENOUS at 12:56

## 2020-10-14 RX ADMIN — DOXORUBICIN HYDROCHLORIDE 94 MG: 2 INJECTION, SOLUTION INTRAVENOUS at 13:55

## 2020-10-14 NOTE — PROGRESS NOTES
Chemotherapy Administration    Pre-assessment Data: Antineoplastic Agents  Other:   See toxicity flow sheet for assessment [x]     Physician Notification of Concerns Related to Chemotherapy Administration:   Physician Notified Anna Fajardo / Time of Notification      Interventions:   Lab work assessed  [x]   Height / Weight verified for dose [x]   Current MAR reviewed [x]   Emergency drugs available as appropriate [x]   Anaphylaxis assessment completed [x]   Pre-medications administered as ordered [x]   Blood return noted upon initiation of chemotherapy [x]   Blood return noted each 1-2ml of a vesicant medication if given IV push [x]   Blood return noted each 2-3ml of a non-vesicant medication if given IV push []   Monitor for signs / symptoms of hypersensitivity reaction [x]   Chemotherapy orders (drug/dose/rate) verified by 2 Chemo certified RNs [x]   Monitor IV site and blood return throughout the infusion of the medication [x]   Document IV site checks on the IV assessment form [x]   Document chemotherapy teaching on the Patient Education tab [x]   Document patient verbalizes understanding of medications being administered [x]   If IV infiltration, see ONS Guidelines []   Other:      []

## 2020-10-14 NOTE — PROGRESS NOTES
Patient assessed for the following post chemotherapy:    Dizziness   No  Lightheadedness  No      Acute nausea/vomiting No  Headache   No  Chest pain/pressure  No  Rash/itching   No  Shortness of breath  No    Patient kept for 20 minutes observation post infusion chemotherapy. Patient tolerated chemotherapy treatment adriamycin, cytoxan, and neulasta on pro without any complications. Last vital signs:   /60   Pulse 67   Temp 98.1 °F (36.7 °C) (Oral)   Resp 16   Ht 5' 8\" (1.727 m)   Wt 114 lb 9.6 oz (52 kg)   SpO2 98%   BMI 17.42 kg/m²     Patient instructed if experience any of the above symptoms following today's infusion,she is to notify MD immediately or go to the emergency department. Discharge instructions given to patient. Verbalizes understanding. Ambulated off unit with family and belongings.

## 2020-10-14 NOTE — PLAN OF CARE
Problem: Infection - Central Venous Catheter-Associated Bloodstream Infection:  Goal: Will show no infection signs and symptoms  Description: Will show no infection signs and symptoms  Outcome: Met This Shift  Note: Mediport site with no redness or warmth. Skin over port intact with no signs of breakdown noted. Patient verbalizes signs/symptoms of port infection and when to notify the physician. Intervention: Infection risk assessment  Note: Discussed port maintenance, infection prevention, signs and when to call the doctor     Problem: Musculor/Skeletal Functional Status  Goal: Absence of falls  Outcome: Met This Shift  Note: Patient verbalizes understanding of fall precautions. Patient free from falls this visit. Intervention: Fall precautions  Note: Discussed fall precautions, call light within reach. Problem: Intellectual/Education/Knowledge Deficit  Goal: Teaching initiated upon admission  Outcome: Met This Shift  Note: Patient verbalizes understanding to verbal information given on adriamycin, cytoxan, and neulasta on pro,action and possible side effects. Aware to call MD if develop complications. Intervention: Verbal/written education provided  Note: Discussed adriamycin, cytoxan, and neulasta on pro action, possible side effects and when to call the doctor. Problem: Discharge Planning  Goal: Knowledge of discharge instructions  Description: Knowledge of discharge instructions  Outcome: Met This Shift  Note: Verbalized understanding of discharge instructions, follow-up appointments, and when to call the physician. Intervention: Discharge to appropriate level of care  Note: Discuss discharge instructions, follow ups, and when to call the doctor. Care plan reviewed with patient and family. Patient and family verbalize understanding of the plan of care and contribute to goal setting.

## 2020-10-14 NOTE — PROGRESS NOTES
Patient instructed that after approximately 27 hours, your On-body injector will beep to let you know your dose delivery will begin in 2 minutes. When the dose delivery starts it will take about 45 minutes to complete. During this time, the On-body injector will flash a green light. You may hear a series of clicks, this is Okay. A beep will sound when the dose delivery is complete. Check to see the status light is Solid Green or has switched off, this means dose is complete. Grab edge of adhesive pad. Slowly peel off the On-body injector and look for black line next to EMPTY indicator. Place into plastic disposable container. If during the administration of drug, the adhesive becomes wet or you notice dripping - call physician immediately.

## 2020-10-15 LAB
ALBUMIN SERPL-MCNC: 4.3 G/DL (ref 3.5–5.1)
ALP BLD-CCNC: 111 U/L (ref 38–126)
ALT SERPL-CCNC: 13 U/L (ref 11–66)
AST SERPL-CCNC: 20 U/L (ref 5–40)
BILIRUB SERPL-MCNC: < 0.2 MG/DL (ref 0.3–1.2)
BILIRUBIN DIRECT: < 0.2 MG/DL (ref 0–0.3)
TOTAL PROTEIN: 6.8 G/DL (ref 6.1–8)

## 2020-10-28 ENCOUNTER — HOSPITAL ENCOUNTER (OUTPATIENT)
Dept: INFUSION THERAPY | Age: 55
Discharge: HOME | End: 2020-10-28
Payer: COMMERCIAL

## 2020-10-28 ENCOUNTER — OFFICE VISIT (OUTPATIENT)
Dept: ONCOLOGY | Age: 55
End: 2020-10-28
Payer: COMMERCIAL

## 2020-10-28 VITALS
HEART RATE: 74 BPM | SYSTOLIC BLOOD PRESSURE: 100 MMHG | RESPIRATION RATE: 16 BRPM | DIASTOLIC BLOOD PRESSURE: 54 MMHG | OXYGEN SATURATION: 98 % | TEMPERATURE: 98 F

## 2020-10-28 VITALS
HEIGHT: 68 IN | RESPIRATION RATE: 18 BRPM | BODY MASS INDEX: 17.58 KG/M2 | OXYGEN SATURATION: 99 % | SYSTOLIC BLOOD PRESSURE: 107 MMHG | DIASTOLIC BLOOD PRESSURE: 57 MMHG | WEIGHT: 116 LBS | TEMPERATURE: 98.1 F | HEART RATE: 72 BPM

## 2020-10-28 DIAGNOSIS — Z51.11 ENCOUNTER FOR CHEMOTHERAPY MANAGEMENT: ICD-10-CM

## 2020-10-28 DIAGNOSIS — C50.411 MALIGNANT NEOPLASM OF UPPER-OUTER QUADRANT OF RIGHT FEMALE BREAST, UNSPECIFIED ESTROGEN RECEPTOR STATUS (HCC): Primary | ICD-10-CM

## 2020-10-28 LAB
BUN, WHOLE BLOOD: 10 MG/DL (ref 8–26)
CHLORIDE, WHOLE BLOOD: 104 MEQ/L (ref 98–109)
CREATININE, WHOLE BLOOD: 0.7 MG/DL (ref 0.5–1.2)
GFR, ESTIMATED ,CON: > 90 ML/MIN/1.73M2
GLUCOSE, WHOLE BLOOD: 92 MG/DL (ref 70–108)
IONIZED CALCIUM, WHOLE BLOOD: 1.15 MMOL/L (ref 1.12–1.32)
POTASSIUM, WHOLE BLOOD: 4.4 MEQ/L (ref 3.5–4.9)
SCAN OF BLOOD SMEAR: NORMAL
SODIUM, WHOLE BLOOD: 144 MEQ/L (ref 138–146)
TOTAL CO2, WHOLE BLOOD: 28 MEQ/L (ref 23–33)

## 2020-10-28 PROCEDURE — 96375 TX/PRO/DX INJ NEW DRUG ADDON: CPT

## 2020-10-28 PROCEDURE — 85025 COMPLETE CBC W/AUTO DIFF WBC: CPT

## 2020-10-28 PROCEDURE — 99215 OFFICE O/P EST HI 40 MIN: CPT | Performed by: INTERNAL MEDICINE

## 2020-10-28 PROCEDURE — 2580000003 HC RX 258: Performed by: INTERNAL MEDICINE

## 2020-10-28 PROCEDURE — 96377 APPLICATON ON-BODY INJECTOR: CPT

## 2020-10-28 PROCEDURE — 99211 OFF/OP EST MAY X REQ PHY/QHP: CPT

## 2020-10-28 PROCEDURE — 80047 BASIC METABLC PNL IONIZED CA: CPT

## 2020-10-28 PROCEDURE — 96413 CHEMO IV INFUSION 1 HR: CPT

## 2020-10-28 PROCEDURE — 96411 CHEMO IV PUSH ADDL DRUG: CPT

## 2020-10-28 PROCEDURE — 6360000002 HC RX W HCPCS: Performed by: INTERNAL MEDICINE

## 2020-10-28 PROCEDURE — 36591 DRAW BLOOD OFF VENOUS DEVICE: CPT

## 2020-10-28 PROCEDURE — 96367 TX/PROPH/DG ADDL SEQ IV INF: CPT

## 2020-10-28 RX ORDER — METHYLPREDNISOLONE SODIUM SUCCINATE 125 MG/2ML
125 INJECTION, POWDER, LYOPHILIZED, FOR SOLUTION INTRAMUSCULAR; INTRAVENOUS ONCE
Status: CANCELLED | OUTPATIENT
Start: 2020-11-11

## 2020-10-28 RX ORDER — HEPARIN SODIUM (PORCINE) LOCK FLUSH IV SOLN 100 UNIT/ML 100 UNIT/ML
500 SOLUTION INTRAVENOUS PRN
Status: DISCONTINUED | OUTPATIENT
Start: 2020-10-28 | End: 2020-10-29 | Stop reason: HOSPADM

## 2020-10-28 RX ORDER — PALONOSETRON 0.05 MG/ML
0.25 INJECTION, SOLUTION INTRAVENOUS ONCE
Status: COMPLETED | OUTPATIENT
Start: 2020-10-28 | End: 2020-10-28

## 2020-10-28 RX ORDER — SODIUM CHLORIDE 0.9 % (FLUSH) 0.9 %
5 SYRINGE (ML) INJECTION PRN
Status: CANCELLED | OUTPATIENT
Start: 2020-10-28

## 2020-10-28 RX ORDER — MEPERIDINE HYDROCHLORIDE 50 MG/ML
12.5 INJECTION INTRAMUSCULAR; INTRAVENOUS; SUBCUTANEOUS ONCE
Status: CANCELLED | OUTPATIENT
Start: 2020-11-11

## 2020-10-28 RX ORDER — DOXORUBICIN HYDROCHLORIDE 2 MG/ML
60 INJECTION, SOLUTION INTRAVENOUS ONCE
Status: COMPLETED | OUTPATIENT
Start: 2020-10-28 | End: 2020-10-28

## 2020-10-28 RX ORDER — DIPHENHYDRAMINE HYDROCHLORIDE 50 MG/ML
50 INJECTION INTRAMUSCULAR; INTRAVENOUS ONCE
Status: CANCELLED | OUTPATIENT
Start: 2020-11-11

## 2020-10-28 RX ORDER — DEXAMETHASONE SODIUM PHOSPHATE 4 MG/ML
10 INJECTION, SOLUTION INTRA-ARTICULAR; INTRALESIONAL; INTRAMUSCULAR; INTRAVENOUS; SOFT TISSUE ONCE
Status: CANCELLED | OUTPATIENT
Start: 2020-11-11

## 2020-10-28 RX ORDER — SODIUM CHLORIDE 0.9 % (FLUSH) 0.9 %
10 SYRINGE (ML) INJECTION PRN
Status: CANCELLED | OUTPATIENT
Start: 2020-10-28

## 2020-10-28 RX ORDER — METHYLPREDNISOLONE SODIUM SUCCINATE 125 MG/2ML
125 INJECTION, POWDER, LYOPHILIZED, FOR SOLUTION INTRAMUSCULAR; INTRAVENOUS ONCE
Status: CANCELLED | OUTPATIENT
Start: 2020-10-28

## 2020-10-28 RX ORDER — PALONOSETRON 0.05 MG/ML
0.25 INJECTION, SOLUTION INTRAVENOUS ONCE
Status: CANCELLED
Start: 2020-11-11

## 2020-10-28 RX ORDER — SODIUM CHLORIDE 0.9 % (FLUSH) 0.9 %
5 SYRINGE (ML) INJECTION PRN
Status: CANCELLED | OUTPATIENT
Start: 2020-11-11

## 2020-10-28 RX ORDER — SODIUM CHLORIDE 9 MG/ML
20 INJECTION, SOLUTION INTRAVENOUS ONCE
Status: CANCELLED | OUTPATIENT
Start: 2020-10-28

## 2020-10-28 RX ORDER — SODIUM CHLORIDE 0.9 % (FLUSH) 0.9 %
10 SYRINGE (ML) INJECTION PRN
Status: DISCONTINUED | OUTPATIENT
Start: 2020-10-28 | End: 2020-10-29 | Stop reason: HOSPADM

## 2020-10-28 RX ORDER — SODIUM CHLORIDE 9 MG/ML
20 INJECTION, SOLUTION INTRAVENOUS ONCE
Status: COMPLETED | OUTPATIENT
Start: 2020-10-28 | End: 2020-10-28

## 2020-10-28 RX ORDER — DOXORUBICIN HYDROCHLORIDE 2 MG/ML
60 INJECTION, SOLUTION INTRAVENOUS ONCE
Status: CANCELLED | OUTPATIENT
Start: 2020-10-28

## 2020-10-28 RX ORDER — PALONOSETRON 0.05 MG/ML
0.25 INJECTION, SOLUTION INTRAVENOUS ONCE
Status: CANCELLED | OUTPATIENT
Start: 2020-10-28

## 2020-10-28 RX ORDER — SODIUM CHLORIDE 0.9 % (FLUSH) 0.9 %
10 SYRINGE (ML) INJECTION PRN
Status: CANCELLED | OUTPATIENT
Start: 2020-11-11

## 2020-10-28 RX ORDER — EPINEPHRINE 1 MG/ML
0.3 INJECTION, SOLUTION, CONCENTRATE INTRAVENOUS PRN
Status: CANCELLED | OUTPATIENT
Start: 2020-11-11

## 2020-10-28 RX ORDER — SODIUM CHLORIDE 9 MG/ML
20 INJECTION, SOLUTION INTRAVENOUS ONCE
Status: CANCELLED | OUTPATIENT
Start: 2020-11-11

## 2020-10-28 RX ORDER — SODIUM CHLORIDE 9 MG/ML
INJECTION, SOLUTION INTRAVENOUS CONTINUOUS
Status: CANCELLED | OUTPATIENT
Start: 2020-11-11

## 2020-10-28 RX ORDER — DIPHENHYDRAMINE HYDROCHLORIDE 50 MG/ML
50 INJECTION INTRAMUSCULAR; INTRAVENOUS ONCE
Status: CANCELLED | OUTPATIENT
Start: 2020-10-28

## 2020-10-28 RX ORDER — HEPARIN SODIUM (PORCINE) LOCK FLUSH IV SOLN 100 UNIT/ML 100 UNIT/ML
500 SOLUTION INTRAVENOUS PRN
Status: CANCELLED | OUTPATIENT
Start: 2020-10-28

## 2020-10-28 RX ORDER — HEPARIN SODIUM (PORCINE) LOCK FLUSH IV SOLN 100 UNIT/ML 100 UNIT/ML
500 SOLUTION INTRAVENOUS PRN
Status: CANCELLED | OUTPATIENT
Start: 2020-11-11

## 2020-10-28 RX ORDER — SODIUM CHLORIDE 9 MG/ML
INJECTION, SOLUTION INTRAVENOUS CONTINUOUS
Status: CANCELLED | OUTPATIENT
Start: 2020-10-28

## 2020-10-28 RX ORDER — EPINEPHRINE 1 MG/ML
0.3 INJECTION, SOLUTION, CONCENTRATE INTRAVENOUS PRN
Status: CANCELLED | OUTPATIENT
Start: 2020-10-28

## 2020-10-28 RX ADMIN — PALONOSETRON 0.25 MG: 0.05 INJECTION, SOLUTION INTRAVENOUS at 10:50

## 2020-10-28 RX ADMIN — PEGFILGRASTIM 6 MG: KIT SUBCUTANEOUS at 13:20

## 2020-10-28 RX ADMIN — DOXORUBICIN HYDROCHLORIDE 94 MG: 2 INJECTION, SOLUTION INTRAVENOUS at 12:06

## 2020-10-28 RX ADMIN — FOSAPREPITANT 150 MG: 150 INJECTION, POWDER, LYOPHILIZED, FOR SOLUTION INTRAVENOUS at 11:26

## 2020-10-28 RX ADMIN — CYCLOPHOSPHAMIDE 940 MG: 1 INJECTION, POWDER, FOR SOLUTION INTRAVENOUS; ORAL at 12:30

## 2020-10-28 RX ADMIN — Medication 10 ML: at 10:50

## 2020-10-28 RX ADMIN — Medication 10 ML: at 09:45

## 2020-10-28 RX ADMIN — Medication 500 UNITS: at 13:22

## 2020-10-28 RX ADMIN — DEXAMETHASONE SODIUM PHOSPHATE 12 MG: 4 INJECTION, SOLUTION INTRAMUSCULAR; INTRAVENOUS at 10:52

## 2020-10-28 RX ADMIN — Medication 10 ML: at 09:46

## 2020-10-28 RX ADMIN — Medication 10 ML: at 13:22

## 2020-10-28 RX ADMIN — SODIUM CHLORIDE 20 ML/HR: 9 INJECTION, SOLUTION INTRAVENOUS at 10:50

## 2020-10-28 NOTE — PROGRESS NOTES
Chemotherapy Administration    Pre-assessment Data: Antineoplastic Agents  Other:   See toxicity flow sheet for assessment [x]     Physician Notification of Concerns Related to Chemotherapy Administration:   Physician Notified Chetan Phoenix / Time of Notification Patient saw Dr Mariela Parra today     Interventions:   Lab work assessed  [x]   Height / Weight verified for dose [x]   Current MAR reviewed [x]   Emergency drugs available as appropriate [x]   Anaphylaxis assessment completed [x]   Pre-medications administered as ordered [x]   Blood return noted upon initiation of chemotherapy [x]   Blood return noted each 1-2ml of a vesicant medication if given IV push [x]   Blood return noted each 2-3ml of a non-vesicant medication if given IV push []   Monitor for signs / symptoms of hypersensitivity reaction [x]   Chemotherapy orders (drug/dose/rate) verified by 2 Chemo certified RNs [x]   Monitor IV site and blood return throughout the infusion of the medication [x]   Document IV site checks on the IV assessment form [x]   Document chemotherapy teaching on the Patient Education tab [x]   Document patient verbalizes understanding of medications being administered [x]   If IV infiltration, see ONS Guidelines []   Other:      []

## 2020-10-28 NOTE — PROGRESS NOTES
[facility] CANCER CENTER  CANCER NETWORK OF [facility]  [facility] [address] [facility]  [address]  [address]  Dept: [phone]  Dept Fax: [phone]: [phone]     Referring Physician:  Dr. Genie Diez    Subjective:      Chief Complaint:  Lindsey Barnhart is a 54 y.o. with breast cancer. The patient is a 59-year-old postmenopausal female with breast cancer located in the right breast.  She was originally diagnosed with breast cancer in December 2009 also involving the right breast.  At that time she was noted to have an invasive ductal carcinoma that was estrogen receptor positive, progesterone separate positive, and HER-2/natty overexpression was negative. She underwent a lumpectomy of the right breast with sentinel node biopsy. Pathology confirmed the invasive ductal carcinoma and one sentinel lymph node was negative for malignancy. Her Oncotype DX score was 18 and therefore she did not receive adjuvant chemotherapy treatment. At the time of her initial diagnosis, she was premenopausal and therefore was placed on tamoxifen therapy. She completed a 5-year course of anti-estrogen therapy. In August 2020, the patient developed a palpable right axillary mass. In context of this condition, the patient had a mammogram and ultrasound completed on August 11, 2020. This confirmed a 2.5 cm right axillary mass with a 1.5 cm adjacent lymph node. Other than the palpable abnormality the patient had no signs or symptoms associated with the mass or other evidence of breast cancer. A modifying factor affecting this condition is that on August 18, 2020 the patient had a biopsy of the axillary mass completed. This confirmed invasive ductal carcinoma which was as receptor positive, progestin receptor positive and HER-2/natty overexpression was negative by FISH. The Ki-67 was 30%.   She was then seen by Dr. Malathi Hair at [facility] reviewed the patients medication list and allergy list as noted on the electronic medical record. The PMH, SH and FH were also reviewed as noted on the EMR. Review of Systems  Constitutional: Negative. HENT: Negative. Eyes: Negative. Respiratory: Negative. Cardiovascular: Negative. Gastrointestinal: Negative. Genitourinary: Negative. Musculoskeletal: Negative. Skin: Negative. Neurological: Negative. Hematological: Negative. Psychiatric/Behavioral: Negative. Objective:   Physical Exam  Vitals:    10/28/20 0939   BP: (!) 107/57   Pulse: 72   Resp: 18   Temp: 98.1 °F (36.7 °C)   SpO2: 99%   Vitals reviewed and are stable. Constitutional: Well-developed. No acute distress. HENT: Normocephalic and atraumatic. Eyes: Pupils appear equal. No scleral icterus. Neck: Overall appearance is symmetrical. No identifiable masses. Chest: Mediport in place in the upper chest. Appears stable. Pulmonary: Effort normal. No respiratory distress. Cardiovascular: No edema in any of the four extremities. Abdominal: Soft. No hepatomegaly or splenomegaly. Musculoskeletal: Gait is normal. Muscle strength and tone grossly good. Neurological: Alert and oriented to person, place, and time. Judgment and thought content normal.  Skin: Skin is warm and dry. No rash. Psychiatric: Mood and affect appropriate for the clinical situation. Data Analysis:    Hematology 10/28/2020 10/14/2020 9/30/2020   WBC 26.9 (H) 24.7 (H) 20.2 (H)   RBC 3.32 (L) 3.54 (L) 3.93 (L)   HGB 10.7 (L) 11.2 (L) 12.4   HCT 33.3 (L) 36.1 (L) 39.7   .3 (H) 102.0 (H) 101.0 (H)   RDW       236 237     Assessment:   1. Recurrent invasive ductal carcinoma of the right breast.  2.  Leukocytosis. 3.  Anemia. 4.  Chemotherapy encounter. Recommendations:   1. Proceed with cycle #4 of Adriamycin and Cytoxan combination chemotherapy. 2.  Monitor total WBC count and for signs of infection/fever.   3.  Monitor hemoglobin/hematocrit and for any signs of blood loss. 4.  Monitor for side effects and toxicity from chemotherapy. 5.  Monitor for response of malignancy. Edyth Bosworth M.D. Medical Director: [facility]  Cancer [place] [address], [address] [place] [address], [address] [place] [address], [address] [place] [address] of the [facility] at [facility]      **This report has been created using voice recognition software. It may contain minor errors which are inherent in voice recognition technology. **

## 2020-10-28 NOTE — PROGRESS NOTES
Patient assessed for the following post chemotherapy:    Dizziness   No  Lightheadedness  No      Acute nausea/vomiting No  Headache   No  Chest pain/pressure  No  Rash/itching   No  Shortness of breath  No    Patient kept for 20 minutes observation post infusion chemotherapy. Patient tolerated chemotherapy treatment adriamycin, cytoxan, and neulasta on pro without any complications. Last vital signs:   BP (!) 100/54   Pulse 74   Temp 98 °F (36.7 °C) (Oral)   Resp 16   SpO2 98%     Patient instructed if experience any of the above symptoms following today's infusion,she is to notify MD immediately or go to the emergency department. Discharge instructions given to patient. Verbalizes understanding. Ambulated off unit with family and belongings.

## 2020-10-28 NOTE — PATIENT INSTRUCTIONS
1.  Chemotherapy today. 2.  Chemotherapy on 11/11/2020, 11/18/2020 and 11/25/2020.  3.  Labs on 11/11/2020, 11/18/2020, and 11/25/2020.  4.  Labs on RTC. 5.  Chemotherapy on RTC.

## 2020-10-28 NOTE — PLAN OF CARE
Problem: Musculor/Skeletal Functional Status  Goal: Absence of falls  Outcome: Met This Shift  Note: Patient verbalizes understanding of fall precautions. Patient free from falls this visit. Intervention: Fall precautions  Note: Discussed fall precautions, call light within reach. Problem: Intellectual/Education/Knowledge Deficit  Goal: Teaching initiated upon admission  Outcome: Met This Shift  Note: Patient verbalizes understanding to verbal information given on adriamycin, cytoxan, and neulasta on pro,action and possible side effects. Aware to call MD if develop complications. Intervention: Verbal/written education provided  Note: Chemotherapy Teaching     What is Chemotherapy   Drug action [x]   Method of Administration [x]   Handouts given []     Side Effects  Nausea/vomiting [x]   Diarrhea [x]   Fatigue [x]   Signs / Symptoms of infection [x]   Neutropenia [x]   Thrombocytopenia [x]   Alopecia [x]   neuropathy [x]   [place] diet &  the importance of fluids [x]       Micellaneous  Importance of nutrition [x]   Importance of oral hygiene [x]   When to call the MD [x]   Monitoring labs [x]   Use of supportive services []     Explanation of Drug Regimen / Frequency  Adriamycin, cytoxan, and neulasta on pro     Comments  Verbalized understanding to drug,action,side effects and when to call MD        Problem: Discharge Planning  Goal: Knowledge of discharge instructions  Description: Knowledge of discharge instructions  Outcome: Met This Shift  Note: Verbalized understanding of discharge instructions, follow-up appointments, and when to call the physician. Intervention: Discharge to appropriate level of care  Note: Discuss discharge instructions, follow ups, and when to call the doctor. Care plan reviewed with patient and family. Patient and family verbalize understanding of the plan of care and contribute to goal setting.

## 2020-10-29 LAB
BASOPHILS # BLD: 0.2 %
BASOPHILS ABSOLUTE: 0.1 THOU/MM3 (ref 0–0.1)
EOSINOPHIL # BLD: 0 %
EOSINOPHILS ABSOLUTE: 0 THOU/MM3 (ref 0–0.4)
ERYTHROCYTE [DISTWIDTH] IN BLOOD BY AUTOMATED COUNT: 15 % (ref 11.5–14.5)
ERYTHROCYTE [DISTWIDTH] IN BLOOD BY AUTOMATED COUNT: 50.4 FL (ref 35–45)
HCT VFR BLD CALC: 33.3 % (ref 37–47)
HEMOGLOBIN: 10.7 GM/DL (ref 12–16)
IMMATURE GRANS (ABS): 7.11 THOU/MM3 (ref 0–0.07)
IMMATURE GRANULOCYTES: 26.5 %
LYMPHOCYTES # BLD: 7.6 %
LYMPHOCYTES ABSOLUTE: 2 THOU/MM3 (ref 1–4.8)
MCH RBC QN AUTO: 32.2 PG (ref 26–33)
MCHC RBC AUTO-ENTMCNC: 32.1 GM/DL (ref 32.2–35.5)
MCV RBC AUTO: 100.3 FL (ref 81–99)
MONOCYTES # BLD: 7.4 %
MONOCYTES ABSOLUTE: 2 THOU/MM3 (ref 0.4–1.3)
NUCLEATED RED BLOOD CELLS: 1 /100 WBC
PATHOLOGIST REVIEW: ABNORMAL
PLATELET # BLD: 262 THOU/MM3 (ref 130–400)
PLATELET ESTIMATE: ADEQUATE
PMV BLD AUTO: 9.3 FL (ref 9.4–12.4)
RBC # BLD: 3.32 MILL/MM3 (ref 4.2–5.4)
SEG NEUTROPHILS: 58.3 %
SEGMENTED NEUTROPHILS ABSOLUTE COUNT: 15.7 THOU/MM3 (ref 1.8–7.7)
TOXIC GRANULATION: PRESENT
WBC # BLD: 26.9 THOU/MM3 (ref 4.8–10.8)

## 2020-11-03 PROBLEM — D72.829 LEUKOCYTOSIS: Status: ACTIVE | Noted: 2020-11-03

## 2020-11-03 PROBLEM — T45.1X5A ANEMIA ASSOCIATED WITH CHEMOTHERAPY: Status: ACTIVE | Noted: 2020-11-03

## 2020-11-03 PROBLEM — D64.81 ANEMIA ASSOCIATED WITH CHEMOTHERAPY: Status: ACTIVE | Noted: 2020-11-03

## 2020-11-11 ENCOUNTER — HOSPITAL ENCOUNTER (OUTPATIENT)
Dept: INFUSION THERAPY | Age: 55
Discharge: HOME | End: 2020-11-11
Payer: COMMERCIAL

## 2020-11-11 VITALS
HEIGHT: 68 IN | DIASTOLIC BLOOD PRESSURE: 55 MMHG | TEMPERATURE: 97.8 F | HEART RATE: 79 BPM | BODY MASS INDEX: 17.73 KG/M2 | OXYGEN SATURATION: 98 % | SYSTOLIC BLOOD PRESSURE: 103 MMHG | WEIGHT: 117 LBS | RESPIRATION RATE: 18 BRPM

## 2020-11-11 DIAGNOSIS — Z51.11 ENCOUNTER FOR CHEMOTHERAPY MANAGEMENT: ICD-10-CM

## 2020-11-11 DIAGNOSIS — C50.411 MALIGNANT NEOPLASM OF UPPER-OUTER QUADRANT OF RIGHT FEMALE BREAST, UNSPECIFIED ESTROGEN RECEPTOR STATUS (HCC): Primary | ICD-10-CM

## 2020-11-11 LAB
BUN, WHOLE BLOOD: 10 MG/DL (ref 8–26)
CHLORIDE, WHOLE BLOOD: 104 MEQ/L (ref 98–109)
CREATININE, WHOLE BLOOD: 0.7 MG/DL (ref 0.5–1.2)
GFR, ESTIMATED ,CON: > 90 ML/MIN/1.73M2
GLUCOSE, WHOLE BLOOD: 79 MG/DL (ref 70–108)
IONIZED CALCIUM, WHOLE BLOOD: 1.15 MMOL/L (ref 1.12–1.32)
POTASSIUM, WHOLE BLOOD: 4.5 MEQ/L (ref 3.5–4.9)
SODIUM, WHOLE BLOOD: 141 MEQ/L (ref 138–146)
TOTAL CO2, WHOLE BLOOD: 28 MEQ/L (ref 23–33)

## 2020-11-11 PROCEDURE — 6360000002 HC RX W HCPCS: Performed by: INTERNAL MEDICINE

## 2020-11-11 PROCEDURE — 85025 COMPLETE CBC W/AUTO DIFF WBC: CPT

## 2020-11-11 PROCEDURE — 2580000003 HC RX 258: Performed by: INTERNAL MEDICINE

## 2020-11-11 PROCEDURE — 36591 DRAW BLOOD OFF VENOUS DEVICE: CPT

## 2020-11-11 PROCEDURE — 2500000003 HC RX 250 WO HCPCS: Performed by: INTERNAL MEDICINE

## 2020-11-11 PROCEDURE — 96375 TX/PRO/DX INJ NEW DRUG ADDON: CPT

## 2020-11-11 PROCEDURE — 99211 OFF/OP EST MAY X REQ PHY/QHP: CPT

## 2020-11-11 PROCEDURE — 96413 CHEMO IV INFUSION 1 HR: CPT

## 2020-11-11 PROCEDURE — 80047 BASIC METABLC PNL IONIZED CA: CPT

## 2020-11-11 RX ORDER — SODIUM CHLORIDE 9 MG/ML
20 INJECTION, SOLUTION INTRAVENOUS ONCE
Status: COMPLETED | OUTPATIENT
Start: 2020-11-11 | End: 2020-11-11

## 2020-11-11 RX ORDER — HEPARIN SODIUM (PORCINE) LOCK FLUSH IV SOLN 100 UNIT/ML 100 UNIT/ML
500 SOLUTION INTRAVENOUS PRN
Status: DISCONTINUED | OUTPATIENT
Start: 2020-11-11 | End: 2020-11-12 | Stop reason: HOSPADM

## 2020-11-11 RX ORDER — DEXAMETHASONE SODIUM PHOSPHATE 4 MG/ML
10 INJECTION, SOLUTION INTRA-ARTICULAR; INTRALESIONAL; INTRAMUSCULAR; INTRAVENOUS; SOFT TISSUE ONCE
Status: COMPLETED | OUTPATIENT
Start: 2020-11-11 | End: 2020-11-11

## 2020-11-11 RX ORDER — DIPHENHYDRAMINE HYDROCHLORIDE 50 MG/ML
50 INJECTION INTRAMUSCULAR; INTRAVENOUS ONCE
Status: COMPLETED | OUTPATIENT
Start: 2020-11-11 | End: 2020-11-11

## 2020-11-11 RX ORDER — SODIUM CHLORIDE 0.9 % (FLUSH) 0.9 %
10 SYRINGE (ML) INJECTION PRN
Status: DISCONTINUED | OUTPATIENT
Start: 2020-11-11 | End: 2020-11-12 | Stop reason: HOSPADM

## 2020-11-11 RX ADMIN — Medication 10 ML: at 14:06

## 2020-11-11 RX ADMIN — PACLITAXEL 126 MG: 6 INJECTION, SOLUTION, CONCENTRATE INTRAVENOUS at 12:35

## 2020-11-11 RX ADMIN — DEXAMETHASONE SODIUM PHOSPHATE 10 MG: 4 INJECTION, SOLUTION INTRAMUSCULAR; INTRAVENOUS at 12:18

## 2020-11-11 RX ADMIN — Medication 10 ML: at 11:11

## 2020-11-11 RX ADMIN — Medication 10 ML: at 12:06

## 2020-11-11 RX ADMIN — FAMOTIDINE 20 MG: 10 INJECTION INTRAVENOUS at 12:12

## 2020-11-11 RX ADMIN — Medication 500 UNITS: at 14:06

## 2020-11-11 RX ADMIN — DIPHENHYDRAMINE HYDROCHLORIDE 50 MG: 50 INJECTION, SOLUTION INTRAMUSCULAR; INTRAVENOUS at 12:15

## 2020-11-11 RX ADMIN — SODIUM CHLORIDE 20 ML/HR: 9 INJECTION, SOLUTION INTRAVENOUS at 12:06

## 2020-11-11 RX ADMIN — Medication 10 ML: at 11:10

## 2020-11-11 NOTE — PROGRESS NOTES
Patient assessed for the following post chemotherapy:    Dizziness   No  Lightheadedness  No      Acute nausea/vomiting No  Headache   No  Chest pain/pressure  No  Rash/itching   No  Shortness of breath  No    Patient kept for 20 minutes observation post infusion chemotherapy. Patient tolerated chemotherapy treatment taxol without any complications. Last vital signs:   BP (!) 103/55   Pulse 79   Temp 97.8 °F (36.6 °C) (Oral)   Resp 18   Ht 5' 8\" (1.727 m)   Wt 117 lb (53.1 kg)   SpO2 98%   BMI 17.79 kg/m²       Patient instructed if experience any of the above symptoms following today's infusion, she is to notify MD immediately or go to the emergency department. Discharge instructions given to patient. Verbalizes understanding. Ambulated off unit per self with belongings.

## 2020-11-11 NOTE — PLAN OF CARE
Problem: Infection - Central Venous Catheter-Associated Bloodstream Infection:  Goal: Will show no infection signs and symptoms  Description: Will show no infection signs and symptoms  Outcome: Met This Shift  Note: Mediport site with no redness or warmth. Skin over port site intact with no signs of breakdown noted. Patient verbalizes signs/symptoms of port infection and when to notify the physician. Intervention: Infection risk assessment  Note: Discuss port maintenance, infection prevention, signs of infection, and when to call the doctor. Problem: Musculor/Skeletal Functional Status  Goal: Absence of falls  Outcome: Met This Shift  Note: Patient free of falls this visit. Intervention: Fall precautions  Note: Fall risks assessed. Precautions discussed. Call light within reach. Patient mobilized with stand by assist during visit. Problem: Intellectual/Education/Knowledge Deficit  Goal: Teaching initiated upon admission  Outcome: Met This Shift  Note: Patient verbalizes understanding to verbal information given on taxol, including action and possible side effects. Aware to call MD if develop complications.     Intervention: Verbal/written education provided  Note: Chemotherapy Teaching     What is Chemotherapy   Drug action [x]   Method of Administration [x]   Handouts given []     Side Effects  Nausea/vomiting [x]   Diarrhea [x]   Fatigue [x]   Signs / Symptoms of infection [x]   Neutropenia [x]   Thrombocytopenia [x]   Alopecia [x]   neuropathy [x]   [place] diet &  the importance of fluids [x]       Micellaneous  Importance of nutrition [x]   Importance of oral hygiene [x]   When to call the MD [x]   Monitoring labs [x]   Use of supportive services []     Explanation of Drug Regimen / Frequency  taxol     Comments  Verbalized understanding to drug,action,side effects and when to call MD         Problem: Discharge Planning  Goal: Knowledge of discharge instructions  Description: Knowledge of discharge instructions  Outcome: Met This Shift  Note: Patient verbalizes understanding of discharge instructions, follow up appointment, and when to call physician if needed   Intervention: Interaction with patient/family and care team  Note: Discharge instructions given to pt and reviewed. Follow up appointments discussed. Care plan reviewed with patient. Patient verbalizes understanding of the plan of care and contributes to goal setting.

## 2020-11-11 NOTE — ONCOLOGY
Chemotherapy Administration    Pre-assessment Data: Antineoplastic Agents  Other:   See toxicity flow sheet for assessment [x]     Physician Notification of Concerns Related to Chemotherapy Administration:   Physician Notified Raisa Cord / Time of Notification      Interventions:   Lab work assessed  [x]   Height / Weight verified for dose [x]   Current MAR reviewed [x]   Emergency drugs available as appropriate [x]   Anaphylaxis assessment completed [x]   Pre-medications administered as ordered [x]   Blood return noted upon initiation of chemotherapy [x]   Blood return noted each 1-2ml of a vesicant medication if given IV push []   Blood return noted each 2-3ml of a non-vesicant medication if given IV push []   Monitor for signs / symptoms of hypersensitivity reaction [x]   Chemotherapy orders (drug/dose/rate) verified by 2 Chemo certified RNs [x]   Monitor IV site and blood return throughout the infusion of the medication [x]   Document IV site checks on the IV assessment form [x]   Document chemotherapy teaching on the Patient Education tab [x]   Document patient verbalizes understanding of medications being administered [x]   If IV infiltration, see ONS Guidelines []   Other:      []

## 2020-11-12 LAB
ANISOCYTOSIS: PRESENT
BASOPHILIA: ABNORMAL
BASOPHILS # BLD: 3.8 %
BASOPHILS ABSOLUTE: 0.8 THOU/MM3 (ref 0–0.1)
CRENATED RBC'S: ABNORMAL
DACROCYTES: ABNORMAL
DIFFERENTIAL TYPE: ABNORMAL
EOSINOPHIL # BLD: 0.1 %
EOSINOPHILS ABSOLUTE: 0 THOU/MM3 (ref 0–0.4)
ERYTHROCYTE [DISTWIDTH] IN BLOOD BY AUTOMATED COUNT: 16.9 % (ref 11.5–14.5)
ERYTHROCYTE [DISTWIDTH] IN BLOOD BY AUTOMATED COUNT: 61.4 FL (ref 35–45)
HCT VFR BLD CALC: 33 % (ref 37–47)
HEMOGLOBIN: 10.2 GM/DL (ref 12–16)
IMMATURE GRANS (ABS): 2.01 THOU/MM3 (ref 0–0.07)
IMMATURE GRANULOCYTES: 10 %
LYMPHOCYTES # BLD: 9.1 %
LYMPHOCYTES ABSOLUTE: 1.8 THOU/MM3 (ref 1–4.8)
MCH RBC QN AUTO: 32.4 PG (ref 26–33)
MCHC RBC AUTO-ENTMCNC: 30.9 GM/DL (ref 32.2–35.5)
MCV RBC AUTO: 104.8 FL (ref 81–99)
MONOCYTES # BLD: 10.9 %
MONOCYTES ABSOLUTE: 2.2 THOU/MM3 (ref 0.4–1.3)
NUCLEATED RED BLOOD CELLS: 1 /100 WBC
PATHOLOGIST REVIEW: ABNORMAL
PLATELET # BLD: 226 THOU/MM3 (ref 130–400)
PLATELET ESTIMATE: ADEQUATE
PMV BLD AUTO: 10 FL (ref 9.4–12.4)
POIKILOCYTES: ABNORMAL
RBC # BLD: 3.15 MILL/MM3 (ref 4.2–5.4)
SCAN OF BLOOD SMEAR: NORMAL
SEG NEUTROPHILS: 66.1 %
SEGMENTED NEUTROPHILS ABSOLUTE COUNT: 13.3 THOU/MM3 (ref 1.8–7.7)
SMUDGE CELLS: PRESENT
TOXIC GRANULATION: PRESENT
WBC # BLD: 20.1 THOU/MM3 (ref 4.8–10.8)

## 2020-11-16 RX ORDER — SODIUM CHLORIDE 9 MG/ML
INJECTION, SOLUTION INTRAVENOUS CONTINUOUS
Status: CANCELLED | OUTPATIENT
Start: 2020-11-18

## 2020-11-16 RX ORDER — SODIUM CHLORIDE 0.9 % (FLUSH) 0.9 %
5 SYRINGE (ML) INJECTION PRN
Status: CANCELLED | OUTPATIENT
Start: 2020-11-18

## 2020-11-16 RX ORDER — DIPHENHYDRAMINE HYDROCHLORIDE 50 MG/ML
50 INJECTION INTRAMUSCULAR; INTRAVENOUS ONCE
Status: CANCELLED | OUTPATIENT
Start: 2020-11-18

## 2020-11-16 RX ORDER — MEPERIDINE HYDROCHLORIDE 25 MG/ML
12.5 INJECTION INTRAMUSCULAR; INTRAVENOUS; SUBCUTANEOUS ONCE
Status: CANCELLED | OUTPATIENT
Start: 2020-11-18

## 2020-11-16 RX ORDER — METHYLPREDNISOLONE SODIUM SUCCINATE 125 MG/2ML
125 INJECTION, POWDER, LYOPHILIZED, FOR SOLUTION INTRAMUSCULAR; INTRAVENOUS ONCE
Status: CANCELLED | OUTPATIENT
Start: 2020-11-18

## 2020-11-18 ENCOUNTER — HOSPITAL ENCOUNTER (OUTPATIENT)
Dept: INFUSION THERAPY | Age: 55
Discharge: HOME | End: 2020-11-18
Payer: COMMERCIAL

## 2020-11-18 VITALS
DIASTOLIC BLOOD PRESSURE: 54 MMHG | SYSTOLIC BLOOD PRESSURE: 97 MMHG | OXYGEN SATURATION: 99 % | HEART RATE: 83 BPM | RESPIRATION RATE: 18 BRPM | TEMPERATURE: 97.8 F

## 2020-11-18 DIAGNOSIS — Z51.11 ENCOUNTER FOR CHEMOTHERAPY MANAGEMENT: ICD-10-CM

## 2020-11-18 DIAGNOSIS — C50.411 MALIGNANT NEOPLASM OF UPPER-OUTER QUADRANT OF RIGHT FEMALE BREAST, UNSPECIFIED ESTROGEN RECEPTOR STATUS (HCC): Primary | ICD-10-CM

## 2020-11-18 LAB
ABSOLUTE IMMATURE GRANULOCYTE: 0.06 THOU/MM3 (ref 0–0.07)
BASINOPHIL, AUTOMATED: 1 % (ref 0–3)
BASOPHILS ABSOLUTE: 0 THOU/MM3 (ref 0–0.1)
BUN, WHOLE BLOOD: 10 MG/DL (ref 8–26)
CHLORIDE, WHOLE BLOOD: 106 MEQ/L (ref 98–109)
CREATININE, WHOLE BLOOD: 0.5 MG/DL (ref 0.5–1.2)
EOSINOPHILS ABSOLUTE: 0 THOU/MM3 (ref 0–0.4)
EOSINOPHILS RELATIVE PERCENT: 0 % (ref 0–4)
GFR, ESTIMATED ,CON: > 90 ML/MIN/1.73M2
GLUCOSE, WHOLE BLOOD: 87 MG/DL (ref 70–108)
HCT VFR BLD CALC: 30 % (ref 37–47)
HEMOGLOBIN: 9.8 GM/DL (ref 12–16)
IMMATURE GRANULOCYTES: 1 %
IONIZED CALCIUM, WHOLE BLOOD: 1.14 MMOL/L (ref 1.12–1.32)
LYMPHOCYTES # BLD: 20 % (ref 15–47)
LYMPHOCYTES ABSOLUTE: 1.1 THOU/MM3 (ref 1–4.8)
MCH RBC QN AUTO: 31.9 PG (ref 26–33)
MCHC RBC AUTO-ENTMCNC: 32.7 GM/DL (ref 32.2–35.5)
MCV RBC AUTO: 98 FL (ref 81–99)
MONOCYTES ABSOLUTE: 0.7 THOU/MM3 (ref 0.4–1.3)
MONOCYTES: 13 % (ref 0–12)
PDW BLD-RTO: 16.5 % (ref 11.5–14.5)
PLATELET # BLD: 349 THOU/MM3 (ref 130–400)
PMV BLD AUTO: 8.6 FL (ref 9.4–12.4)
POTASSIUM, WHOLE BLOOD: 4.1 MEQ/L (ref 3.5–4.9)
RBC # BLD: 3.07 MILL/MM3 (ref 4.2–5.4)
SEG NEUTROPHILS: 65 % (ref 43–75)
SEGMENTED NEUTROPHILS ABSOLUTE COUNT: 3.5 THOU/MM3 (ref 1.8–7.7)
SODIUM, WHOLE BLOOD: 141 MEQ/L (ref 138–146)
TOTAL CO2, WHOLE BLOOD: 26 MEQ/L (ref 23–33)
WBC # BLD: 5.5 THOU/MM3 (ref 4.8–10.8)

## 2020-11-18 PROCEDURE — 96413 CHEMO IV INFUSION 1 HR: CPT

## 2020-11-18 PROCEDURE — 2580000003 HC RX 258: Performed by: INTERNAL MEDICINE

## 2020-11-18 PROCEDURE — 96375 TX/PRO/DX INJ NEW DRUG ADDON: CPT

## 2020-11-18 PROCEDURE — 36591 DRAW BLOOD OFF VENOUS DEVICE: CPT

## 2020-11-18 PROCEDURE — 80047 BASIC METABLC PNL IONIZED CA: CPT

## 2020-11-18 PROCEDURE — 2500000003 HC RX 250 WO HCPCS: Performed by: INTERNAL MEDICINE

## 2020-11-18 PROCEDURE — 6360000002 HC RX W HCPCS: Performed by: INTERNAL MEDICINE

## 2020-11-18 PROCEDURE — 85025 COMPLETE CBC W/AUTO DIFF WBC: CPT

## 2020-11-18 RX ORDER — SODIUM CHLORIDE 0.9 % (FLUSH) 0.9 %
10 SYRINGE (ML) INJECTION PRN
Status: DISCONTINUED | OUTPATIENT
Start: 2020-11-18 | End: 2020-11-19 | Stop reason: HOSPADM

## 2020-11-18 RX ORDER — DEXAMETHASONE SODIUM PHOSPHATE 4 MG/ML
10 INJECTION, SOLUTION INTRA-ARTICULAR; INTRALESIONAL; INTRAMUSCULAR; INTRAVENOUS; SOFT TISSUE ONCE
Status: COMPLETED | OUTPATIENT
Start: 2020-11-18 | End: 2020-11-18

## 2020-11-18 RX ORDER — SODIUM CHLORIDE 9 MG/ML
20 INJECTION, SOLUTION INTRAVENOUS ONCE
Status: COMPLETED | OUTPATIENT
Start: 2020-11-18 | End: 2020-11-18

## 2020-11-18 RX ORDER — DIPHENHYDRAMINE HYDROCHLORIDE 50 MG/ML
50 INJECTION INTRAMUSCULAR; INTRAVENOUS ONCE
Status: COMPLETED | OUTPATIENT
Start: 2020-11-18 | End: 2020-11-18

## 2020-11-18 RX ORDER — HEPARIN SODIUM (PORCINE) LOCK FLUSH IV SOLN 100 UNIT/ML 100 UNIT/ML
500 SOLUTION INTRAVENOUS PRN
Status: DISCONTINUED | OUTPATIENT
Start: 2020-11-18 | End: 2020-11-19 | Stop reason: HOSPADM

## 2020-11-18 RX ADMIN — Medication 10 ML: at 11:30

## 2020-11-18 RX ADMIN — FAMOTIDINE 20 MG: 10 INJECTION INTRAVENOUS at 12:14

## 2020-11-18 RX ADMIN — Medication 10 ML: at 12:02

## 2020-11-18 RX ADMIN — DEXAMETHASONE SODIUM PHOSPHATE 10 MG: 4 INJECTION, SOLUTION INTRAMUSCULAR; INTRAVENOUS at 12:19

## 2020-11-18 RX ADMIN — Medication 500 UNITS: at 13:58

## 2020-11-18 RX ADMIN — Medication 10 ML: at 11:31

## 2020-11-18 RX ADMIN — DIPHENHYDRAMINE HYDROCHLORIDE 25 MG: 50 INJECTION INTRAMUSCULAR; INTRAVENOUS at 12:16

## 2020-11-18 RX ADMIN — PACLITAXEL 126 MG: 6 INJECTION, SOLUTION, CONCENTRATE INTRAVENOUS at 12:29

## 2020-11-18 RX ADMIN — SODIUM CHLORIDE 20 ML/HR: 9 INJECTION, SOLUTION INTRAVENOUS at 12:02

## 2020-11-18 RX ADMIN — Medication 10 ML: at 13:58

## 2020-11-18 NOTE — PLAN OF CARE
Problem: Infection - Central Venous Catheter-Associated Bloodstream Infection:  Goal: Will show no infection signs and symptoms  Description: Will show no infection signs and symptoms  Outcome: Met This Shift  Note: Mediport site with no redness or warmth. Skin over port site intact with no signs of breakdown noted. Patient verbalizes signs/symptoms of port infection and when to notify the physician. Intervention: Infection risk assessment  Note: Discuss port maintenance, infection prevention, signs of infection, and when to call the doctor. Problem: Musculor/Skeletal Functional Status  Goal: Absence of falls  Outcome: Met This Shift  Note: Patient free of falls this visit. Intervention: Fall precautions  Note: Fall risks assessed. Precautions discussed. Call light within reach during visit. Problem: Intellectual/Education/Knowledge Deficit  Goal: Teaching initiated upon admission  Outcome: Met This Shift  Note: Patient verbalizes understanding to verbal information given on taxol, including action and possible side effects. Aware to call MD if develop complications.     Intervention: Verbal/written education provided  Note: Chemotherapy Teaching     What is Chemotherapy   Drug action [x]   Method of Administration [x]   Handouts given []     Side Effects  Nausea/vomiting [x]   Diarrhea [x]   Fatigue [x]   Signs / Symptoms of infection [x]   Neutropenia [x]   Thrombocytopenia [x]   Alopecia [x]   neuropathy [x]   [place] diet &  the importance of fluids [x]       Micellaneous  Importance of nutrition [x]   Importance of oral hygiene [x]   When to call the MD [x]   Monitoring labs [x]   Use of supportive services []     Explanation of Drug Regimen / Frequency  taxol     Comments  Verbalized understanding to drug,action,side effects and when to call MD         Problem: Discharge Planning  Goal: Knowledge of discharge instructions  Description: Knowledge of discharge instructions  Outcome: Met This Shift  Note: Patient verbalizes understanding of discharge instructions, follow up appointment, and when to call physician if needed   Intervention: Interaction with patient/family and care team  Note: Discharge instructions given to pt and reviewed. Follow up appointments discussed. Care plan reviewed with patient. Patient verbalizes understanding of the plan of care and contributes to goal setting.

## 2020-11-18 NOTE — PROGRESS NOTES
Chemotherapy Administration    Pre-assessment Data: Antineoplastic Agents  Other:   See toxicity flow sheet for assessment [x]     Physician Notification of Concerns Related to Chemotherapy Administration:   Physician Notified Sergeye Snare / Time of Notification      Interventions:   Lab work assessed  [x]   Height / Weight verified for dose [x]   Current MAR reviewed [x]   Emergency drugs available as appropriate [x]   Anaphylaxis assessment completed [x]   Pre-medications administered as ordered [x]   Blood return noted upon initiation of chemotherapy [x]   Blood return noted each 1-2ml of a vesicant medication if given IV push []   Blood return noted each 2-3ml of a non-vesicant medication if given IV push []   Monitor for signs / symptoms of hypersensitivity reaction [x]   Chemotherapy orders (drug/dose/rate) verified by 2 Chemo certified RNs [x]   Monitor IV site and blood return throughout the infusion of the medication [x]   Document IV site checks on the IV assessment form [x]   Document chemotherapy teaching on the Patient Education tab [x]   Document patient verbalizes understanding of medications being administered [x]   If IV infiltration, see ONS Guidelines []   Other:      []         Taxol Administration:  Taxol is filtered, use specific tubing for administration. If hypersensitivity reaction occurs, STOP TAXOL, maintain plain IV and notify physician for additional orders. Taxol is considered an irritant in low doses. High dose Taxol is considered a vesicant. Check with physician if infusion should be through a central line.    Neurological assessment completed pre administration [x]   Pre-medications administered as ordered [x]   Document baseline vital signs before administration [x]   For 3 hour Taxol: Document blood pressure, pulse, respiratory rate every 15 min times 1 hour after the start of Taxol []   For 1 hour Taxol: Document blood pressure, pulse, respiratory rate 15 min after the start of Taxol [x]   Document blood pressure, pulse, respiratory rate at the completion of Taxol [x]   Other:     []

## 2020-11-18 NOTE — PROGRESS NOTES
Patient assessed for the following post chemotherapy:    Dizziness   No  Lightheadedness  No      Acute nausea/vomiting No  Headache   No  Chest pain/pressure  No  Rash/itching   No  Shortness of breath  No    Patient kept for 20 minutes observation post infusion chemotherapy. Patient tolerated chemotherapy treatment with taxol without any complications. Last vital signs:   BP (!) 97/54   Pulse 83   Temp 97.8 °F (36.6 °C) (Oral)   Resp 18   SpO2 99%       Patient instructed if experience any of the above symptoms following today's infusion,she is to notify MD immediately or go to the emergency department. Discharge instructions given to patient. Verbalizes understanding. Ambulated off unit per self  with belongings. patients  waiting in car to drive her home

## 2020-11-25 ENCOUNTER — HOSPITAL ENCOUNTER (OUTPATIENT)
Dept: INFUSION THERAPY | Age: 55
Discharge: HOME | End: 2020-11-25
Payer: COMMERCIAL

## 2020-11-25 VITALS
TEMPERATURE: 97.9 F | WEIGHT: 117.2 LBS | OXYGEN SATURATION: 98 % | DIASTOLIC BLOOD PRESSURE: 58 MMHG | SYSTOLIC BLOOD PRESSURE: 99 MMHG | BODY MASS INDEX: 17.82 KG/M2 | HEART RATE: 79 BPM | RESPIRATION RATE: 16 BRPM

## 2020-11-25 DIAGNOSIS — Z51.11 ENCOUNTER FOR CHEMOTHERAPY MANAGEMENT: Primary | ICD-10-CM

## 2020-11-25 DIAGNOSIS — C50.411 MALIGNANT NEOPLASM OF UPPER-OUTER QUADRANT OF RIGHT FEMALE BREAST, UNSPECIFIED ESTROGEN RECEPTOR STATUS (HCC): ICD-10-CM

## 2020-11-25 LAB
ABSOLUTE IMMATURE GRANULOCYTE: 0.03 THOU/MM3 (ref 0–0.07)
ALBUMIN SERPL-MCNC: 4 G/DL (ref 3.5–5.1)
ALP BLD-CCNC: 73 U/L (ref 38–126)
ALT SERPL-CCNC: 22 U/L (ref 11–66)
AST SERPL-CCNC: 27 U/L (ref 5–40)
BASINOPHIL, AUTOMATED: 2 % (ref 0–3)
BASOPHILS ABSOLUTE: 0 THOU/MM3 (ref 0–0.1)
BILIRUB SERPL-MCNC: 0.3 MG/DL (ref 0.3–1.2)
BILIRUBIN DIRECT: < 0.2 MG/DL (ref 0–0.3)
BUN, WHOLE BLOOD: 10 MG/DL (ref 8–26)
CHLORIDE, WHOLE BLOOD: 106 MEQ/L (ref 98–109)
CREATININE, WHOLE BLOOD: 0.7 MG/DL (ref 0.5–1.2)
EOSINOPHILS ABSOLUTE: 0 THOU/MM3 (ref 0–0.4)
EOSINOPHILS RELATIVE PERCENT: 1 % (ref 0–4)
GFR, ESTIMATED ,CON: > 90 ML/MIN/1.73M2
GLUCOSE, WHOLE BLOOD: 106 MG/DL (ref 70–108)
HCT VFR BLD CALC: 31.5 % (ref 37–47)
HEMOGLOBIN: 10.4 GM/DL (ref 12–16)
IMMATURE GRANULOCYTES: 1 %
IONIZED CALCIUM, WHOLE BLOOD: 1.17 MMOL/L (ref 1.12–1.32)
LYMPHOCYTES # BLD: 32 % (ref 15–47)
LYMPHOCYTES ABSOLUTE: 0.9 THOU/MM3 (ref 1–4.8)
MCH RBC QN AUTO: 32.3 PG (ref 26–33)
MCHC RBC AUTO-ENTMCNC: 33 GM/DL (ref 32.2–35.5)
MCV RBC AUTO: 98 FL (ref 81–99)
MONOCYTES ABSOLUTE: 0.4 THOU/MM3 (ref 0.4–1.3)
MONOCYTES: 16 % (ref 0–12)
PDW BLD-RTO: 16.5 % (ref 11.5–14.5)
PLATELET # BLD: 272 THOU/MM3 (ref 130–400)
PMV BLD AUTO: 8.5 FL (ref 9.4–12.4)
POTASSIUM, WHOLE BLOOD: 3.7 MEQ/L (ref 3.5–4.9)
RBC # BLD: 3.22 MILL/MM3 (ref 4.2–5.4)
SEG NEUTROPHILS: 49 % (ref 43–75)
SEGMENTED NEUTROPHILS ABSOLUTE COUNT: 1.3 THOU/MM3 (ref 1.8–7.7)
SODIUM, WHOLE BLOOD: 140 MEQ/L (ref 138–146)
TOTAL CO2, WHOLE BLOOD: 27 MEQ/L (ref 23–33)
TOTAL PROTEIN: 6.3 G/DL (ref 6.1–8)
WBC # BLD: 2.7 THOU/MM3 (ref 4.8–10.8)

## 2020-11-25 PROCEDURE — 36591 DRAW BLOOD OFF VENOUS DEVICE: CPT

## 2020-11-25 PROCEDURE — 85025 COMPLETE CBC W/AUTO DIFF WBC: CPT

## 2020-11-25 PROCEDURE — 99211 OFF/OP EST MAY X REQ PHY/QHP: CPT

## 2020-11-25 PROCEDURE — 96413 CHEMO IV INFUSION 1 HR: CPT

## 2020-11-25 PROCEDURE — 2500000003 HC RX 250 WO HCPCS: Performed by: INTERNAL MEDICINE

## 2020-11-25 PROCEDURE — 80076 HEPATIC FUNCTION PANEL: CPT

## 2020-11-25 PROCEDURE — 2580000003 HC RX 258: Performed by: INTERNAL MEDICINE

## 2020-11-25 PROCEDURE — 6360000002 HC RX W HCPCS: Performed by: INTERNAL MEDICINE

## 2020-11-25 PROCEDURE — 80047 BASIC METABLC PNL IONIZED CA: CPT

## 2020-11-25 PROCEDURE — 96375 TX/PRO/DX INJ NEW DRUG ADDON: CPT

## 2020-11-25 RX ORDER — MEPERIDINE HYDROCHLORIDE 25 MG/ML
12.5 INJECTION INTRAMUSCULAR; INTRAVENOUS; SUBCUTANEOUS ONCE
Status: CANCELLED | OUTPATIENT
Start: 2020-12-02

## 2020-11-25 RX ORDER — SODIUM CHLORIDE 0.9 % (FLUSH) 0.9 %
5 SYRINGE (ML) INJECTION PRN
Status: CANCELLED | OUTPATIENT
Start: 2020-12-02

## 2020-11-25 RX ORDER — SODIUM CHLORIDE 9 MG/ML
20 INJECTION, SOLUTION INTRAVENOUS ONCE
Status: COMPLETED | OUTPATIENT
Start: 2020-11-25 | End: 2020-11-25

## 2020-11-25 RX ORDER — HEPARIN SODIUM (PORCINE) LOCK FLUSH IV SOLN 100 UNIT/ML 100 UNIT/ML
500 SOLUTION INTRAVENOUS PRN
Status: DISCONTINUED | OUTPATIENT
Start: 2020-11-25 | End: 2020-11-26 | Stop reason: HOSPADM

## 2020-11-25 RX ORDER — SODIUM CHLORIDE 0.9 % (FLUSH) 0.9 %
10 SYRINGE (ML) INJECTION PRN
Status: CANCELLED | OUTPATIENT
Start: 2020-12-02

## 2020-11-25 RX ORDER — HEPARIN SODIUM (PORCINE) LOCK FLUSH IV SOLN 100 UNIT/ML 100 UNIT/ML
500 SOLUTION INTRAVENOUS PRN
Status: CANCELLED | OUTPATIENT
Start: 2020-12-02

## 2020-11-25 RX ORDER — SODIUM CHLORIDE 0.9 % (FLUSH) 0.9 %
5 SYRINGE (ML) INJECTION PRN
Status: CANCELLED | OUTPATIENT
Start: 2020-11-25

## 2020-11-25 RX ORDER — SODIUM CHLORIDE 9 MG/ML
INJECTION, SOLUTION INTRAVENOUS CONTINUOUS
Status: CANCELLED | OUTPATIENT
Start: 2020-11-25

## 2020-11-25 RX ORDER — DIPHENHYDRAMINE HYDROCHLORIDE 50 MG/ML
50 INJECTION INTRAMUSCULAR; INTRAVENOUS ONCE
Status: CANCELLED | OUTPATIENT
Start: 2020-11-25

## 2020-11-25 RX ORDER — SODIUM CHLORIDE 0.9 % (FLUSH) 0.9 %
10 SYRINGE (ML) INJECTION PRN
Status: DISCONTINUED | OUTPATIENT
Start: 2020-11-25 | End: 2020-11-26 | Stop reason: HOSPADM

## 2020-11-25 RX ORDER — MEPERIDINE HYDROCHLORIDE 25 MG/ML
12.5 INJECTION INTRAMUSCULAR; INTRAVENOUS; SUBCUTANEOUS ONCE
Status: CANCELLED | OUTPATIENT
Start: 2020-11-25

## 2020-11-25 RX ORDER — DIPHENHYDRAMINE HYDROCHLORIDE 50 MG/ML
50 INJECTION INTRAMUSCULAR; INTRAVENOUS ONCE
Status: CANCELLED | OUTPATIENT
Start: 2020-12-02

## 2020-11-25 RX ORDER — DEXAMETHASONE SODIUM PHOSPHATE 4 MG/ML
10 INJECTION, SOLUTION INTRA-ARTICULAR; INTRALESIONAL; INTRAMUSCULAR; INTRAVENOUS; SOFT TISSUE ONCE
Status: CANCELLED | OUTPATIENT
Start: 2020-12-02

## 2020-11-25 RX ORDER — METHYLPREDNISOLONE SODIUM SUCCINATE 125 MG/2ML
125 INJECTION, POWDER, LYOPHILIZED, FOR SOLUTION INTRAMUSCULAR; INTRAVENOUS ONCE
Status: CANCELLED | OUTPATIENT
Start: 2020-12-02

## 2020-11-25 RX ORDER — METHYLPREDNISOLONE SODIUM SUCCINATE 125 MG/2ML
125 INJECTION, POWDER, LYOPHILIZED, FOR SOLUTION INTRAMUSCULAR; INTRAVENOUS ONCE
Status: CANCELLED | OUTPATIENT
Start: 2020-11-25

## 2020-11-25 RX ORDER — SODIUM CHLORIDE 9 MG/ML
INJECTION, SOLUTION INTRAVENOUS CONTINUOUS
Status: CANCELLED | OUTPATIENT
Start: 2020-12-02

## 2020-11-25 RX ORDER — DIPHENHYDRAMINE HYDROCHLORIDE 50 MG/ML
50 INJECTION INTRAMUSCULAR; INTRAVENOUS ONCE
Status: COMPLETED | OUTPATIENT
Start: 2020-11-25 | End: 2020-11-25

## 2020-11-25 RX ORDER — SODIUM CHLORIDE 9 MG/ML
20 INJECTION, SOLUTION INTRAVENOUS ONCE
Status: CANCELLED | OUTPATIENT
Start: 2020-12-02

## 2020-11-25 RX ORDER — DEXAMETHASONE SODIUM PHOSPHATE 4 MG/ML
10 INJECTION, SOLUTION INTRA-ARTICULAR; INTRALESIONAL; INTRAMUSCULAR; INTRAVENOUS; SOFT TISSUE ONCE
Status: COMPLETED | OUTPATIENT
Start: 2020-11-25 | End: 2020-11-25

## 2020-11-25 RX ADMIN — Medication 10 ML: at 08:49

## 2020-11-25 RX ADMIN — Medication 10 ML: at 08:48

## 2020-11-25 RX ADMIN — Medication 500 UNITS: at 10:35

## 2020-11-25 RX ADMIN — FAMOTIDINE 20 MG: 10 INJECTION INTRAVENOUS at 08:49

## 2020-11-25 RX ADMIN — Medication 10 ML: at 10:35

## 2020-11-25 RX ADMIN — Medication 10 ML: at 08:05

## 2020-11-25 RX ADMIN — SODIUM CHLORIDE 20 ML/HR: 9 INJECTION, SOLUTION INTRAVENOUS at 08:48

## 2020-11-25 RX ADMIN — DEXAMETHASONE SODIUM PHOSPHATE 10 MG: 4 INJECTION, SOLUTION INTRAMUSCULAR; INTRAVENOUS at 09:00

## 2020-11-25 RX ADMIN — Medication 10 ML: at 08:06

## 2020-11-25 RX ADMIN — DIPHENHYDRAMINE HYDROCHLORIDE 25 MG: 50 INJECTION, SOLUTION INTRAMUSCULAR; INTRAVENOUS at 08:54

## 2020-11-25 RX ADMIN — PACLITAXEL 126 MG: 6 INJECTION, SOLUTION, CONCENTRATE INTRAVENOUS at 09:07

## 2020-11-25 NOTE — PLAN OF CARE
Care plan reviewed with patient. Patient verbalized understanding of the plan of care and contribute to goal setting. Problem: Intellectual/Education/Knowledge Deficit  Goal: Teaching initiated upon admission  Outcome: Met This Shift  Note: Discuss port maintenance, infection prevention, signs and when to call Dr   Intervention: Verbal/written education provided  Note: Patient verbalizes understanding to verbal information given on taxol,action and possible side effects. Aware to call MD if develop complications. Problem: Discharge Planning  Goal: Knowledge of discharge instructions  Description: Knowledge of discharge instructions  Outcome: Met This Shift  Note: Patient verbalizes understanding to verbal information given on taxol,action and possible side effects. Aware to call MD if develop complications. Intervention: Discharge to appropriate level of care  Note: Chemotherapy Teaching     What is Chemotherapy   Drug action [x]   Method of Administration [x]   Handouts given []     Side Effects  Nausea/vomiting [x]   Diarrhea [x]   Fatigue [x]   Signs / Symptoms of infection [x]   Neutropenia [x]   Thrombocytopenia [x]   Alopecia [x]   neuropathy [x]   [place] diet &  the importance of fluids [x]       Micellaneous  Importance of nutrition [x]   Importance of oral hygiene [x]   When to call the MD [x]   Monitoring labs [x]   Use of supportive services []     Explanation of Drug Regimen / Frequency  taxol     Comments  Verbalized understanding to drug,action,side effects and when to call MD         Problem: Falls - Risk of:  Goal: Will remain free from falls  Description: Will remain free from falls  Outcome: Met This Shift  Note: Free from falls while in infusion center.  Verbalized understanding of fall prevention and to ask for assistance with ambulation   Intervention: Assess risk factors for falls  Description: Assess risk factors for falls  Note: Assess for fall risk, instruct to ask for assistance with ambulation      Problem: Infection - Central Venous Catheter-Associated Bloodstream Infection:  Goal: Will show no infection signs and symptoms  Description: Will show no infection signs and symptoms  Outcome: Met This Shift  Note: Mediport site with no redness or warmth. Skin over port intact with no signs of breakdown noted. Patient verbalizes signs/symptoms of port infection and when to notify the physician. Intervention: Infection risk assessment  Description: Infection risk assessment  Note: Mediport site with no redness or warmth. Skin over port intact with no signs of breakdown noted. Patient verbalizes signs/symptoms of port infection and when to notify the physician.

## 2020-11-25 NOTE — PROGRESS NOTES
Patient assessed for the following post chemotherapy:    Dizziness   No  Lightheadedness  No      Acute nausea/vomiting No  Headache   No  Chest pain/pressure  No  Rash/itching   No  Shortness of breath  No    Patient kept for 20 minutes observation post infusion chemotherapy. Patient tolerated chemotherapy treatment taxol without any complications. Last vital signs:   BP (!) 99/58   Pulse 79   Temp 97.9 °F (36.6 °C) (Oral)   Resp 16   Wt 117 lb 3.2 oz (53.2 kg)   SpO2 98%   BMI 17.82 kg/m²     Patient instructed if experience any of the above symptoms following today's infusion,she is to notify MD immediately or go to the emergency department. Discharge instructions given to patient. Verbalizes understanding. Ambulated off unit per self with belongings.

## 2020-11-25 NOTE — PROGRESS NOTES
Chemotherapy Administration    Pre-assessment Data: Antineoplastic Agents  Other:   See toxicity flow sheet for assessment [x]     Physician Notification of Concerns Related to Chemotherapy Administration:   Physician Notified Beatriz Mcdonnell[place] / Time of Notification      Interventions:   Lab work assessed  [x]   Height / Weight verified for dose [x]   Current MAR reviewed [x]   Emergency drugs available as appropriate [x]   Anaphylaxis assessment completed [x]   Pre-medications administered as ordered [x]   Blood return noted upon initiation of chemotherapy [x]   Blood return noted each 1-2ml of a vesicant medication if given IV push []   Blood return noted each 2-3ml of a non-vesicant medication if given IV push []   Monitor for signs / symptoms of hypersensitivity reaction [x]   Chemotherapy orders (drug/dose/rate) verified by 2 Chemo certified RNs [x]   Monitor IV site and blood return throughout the infusion of the medication [x]   Document IV site checks on the IV assessment form [x]   Document chemotherapy teaching on the Patient Education tab [x]   Document patient verbalizes understanding of medications being administered [x]   If IV infiltration, see ONS Guidelines []   Other:      []         Taxol Administration:  Taxol is filtered, use specific tubing for administration. If hypersensitivity reaction occurs, STOP TAXOL, maintain plain IV and notify physician for additional orders. Taxol is considered an irritant in low doses. High dose Taxol is considered a vesicant. Check with physician if infusion should be through a central line.    Neurological assessment completed pre administration [x]   Pre-medications administered as ordered [x]   Document baseline vital signs before administration [x]   For 3 hour Taxol: Document blood pressure, pulse, respiratory rate every 15 min times 1 hour after the start of Taxol []   For 1 hour Taxol: Document blood pressure, pulse, respiratory rate 15 min after the start of Taxol [x]   Document blood pressure, pulse, respiratory rate at the completion of Taxol [x]   Other:     []

## 2020-12-02 ENCOUNTER — OFFICE VISIT (OUTPATIENT)
Dept: ONCOLOGY | Age: 55
End: 2020-12-02
Payer: COMMERCIAL

## 2020-12-02 ENCOUNTER — HOSPITAL ENCOUNTER (OUTPATIENT)
Dept: INFUSION THERAPY | Age: 55
Discharge: HOME | End: 2020-12-02
Payer: COMMERCIAL

## 2020-12-02 VITALS
DIASTOLIC BLOOD PRESSURE: 56 MMHG | OXYGEN SATURATION: 100 % | RESPIRATION RATE: 18 BRPM | TEMPERATURE: 97.7 F | HEART RATE: 77 BPM | SYSTOLIC BLOOD PRESSURE: 114 MMHG

## 2020-12-02 VITALS
TEMPERATURE: 98.1 F | DIASTOLIC BLOOD PRESSURE: 51 MMHG | HEART RATE: 86 BPM | SYSTOLIC BLOOD PRESSURE: 96 MMHG | OXYGEN SATURATION: 100 % | RESPIRATION RATE: 18 BRPM

## 2020-12-02 DIAGNOSIS — C50.411 MALIGNANT NEOPLASM OF UPPER-OUTER QUADRANT OF RIGHT FEMALE BREAST, UNSPECIFIED ESTROGEN RECEPTOR STATUS (HCC): Primary | ICD-10-CM

## 2020-12-02 DIAGNOSIS — Z51.11 ENCOUNTER FOR CHEMOTHERAPY MANAGEMENT: ICD-10-CM

## 2020-12-02 LAB
ABSOLUTE IMMATURE GRANULOCYTE: 0.01 THOU/MM3 (ref 0–0.07)
BASINOPHIL, AUTOMATED: 1 % (ref 0–3)
BASOPHILS ABSOLUTE: 0 THOU/MM3 (ref 0–0.1)
BUN, WHOLE BLOOD: 11 MG/DL (ref 8–26)
CHLORIDE, WHOLE BLOOD: 105 MEQ/L (ref 98–109)
CREATININE, WHOLE BLOOD: 0.8 MG/DL (ref 0.5–1.2)
EOSINOPHILS ABSOLUTE: 0.1 THOU/MM3 (ref 0–0.4)
EOSINOPHILS RELATIVE PERCENT: 2 % (ref 0–4)
GFR, ESTIMATED ,CON: 79 ML/MIN/1.73M2
GLUCOSE, WHOLE BLOOD: 92 MG/DL (ref 70–108)
HCT VFR BLD CALC: 32.1 % (ref 37–47)
HEMOGLOBIN: 10.6 GM/DL (ref 12–16)
IMMATURE GRANULOCYTES: 0 %
IONIZED CALCIUM, WHOLE BLOOD: 1.16 MMOL/L (ref 1.12–1.32)
LYMPHOCYTES # BLD: 35 % (ref 15–47)
LYMPHOCYTES ABSOLUTE: 1 THOU/MM3 (ref 1–4.8)
MCH RBC QN AUTO: 32.7 PG (ref 26–33)
MCHC RBC AUTO-ENTMCNC: 33 GM/DL (ref 32.2–35.5)
MCV RBC AUTO: 99 FL (ref 81–99)
MONOCYTES ABSOLUTE: 0.4 THOU/MM3 (ref 0.4–1.3)
MONOCYTES: 15 % (ref 0–12)
PDW BLD-RTO: 16 % (ref 11.5–14.5)
PLATELET # BLD: 233 THOU/MM3 (ref 130–400)
PMV BLD AUTO: 8.9 FL (ref 9.4–12.4)
POTASSIUM, WHOLE BLOOD: 4.3 MEQ/L (ref 3.5–4.9)
RBC # BLD: 3.24 MILL/MM3 (ref 4.2–5.4)
SEG NEUTROPHILS: 46 % (ref 43–75)
SEGMENTED NEUTROPHILS ABSOLUTE COUNT: 1.3 THOU/MM3 (ref 1.8–7.7)
SODIUM, WHOLE BLOOD: 140 MEQ/L (ref 138–146)
TOTAL CO2, WHOLE BLOOD: 27 MEQ/L (ref 23–33)
WBC # BLD: 2.9 THOU/MM3 (ref 4.8–10.8)

## 2020-12-02 PROCEDURE — 2580000003 HC RX 258: Performed by: INTERNAL MEDICINE

## 2020-12-02 PROCEDURE — 36591 DRAW BLOOD OFF VENOUS DEVICE: CPT

## 2020-12-02 PROCEDURE — 96375 TX/PRO/DX INJ NEW DRUG ADDON: CPT

## 2020-12-02 PROCEDURE — 85025 COMPLETE CBC W/AUTO DIFF WBC: CPT

## 2020-12-02 PROCEDURE — 2500000003 HC RX 250 WO HCPCS: Performed by: INTERNAL MEDICINE

## 2020-12-02 PROCEDURE — 6360000002 HC RX W HCPCS: Performed by: INTERNAL MEDICINE

## 2020-12-02 PROCEDURE — 80047 BASIC METABLC PNL IONIZED CA: CPT

## 2020-12-02 PROCEDURE — 96413 CHEMO IV INFUSION 1 HR: CPT

## 2020-12-02 PROCEDURE — 99215 OFFICE O/P EST HI 40 MIN: CPT | Performed by: INTERNAL MEDICINE

## 2020-12-02 PROCEDURE — 99211 OFF/OP EST MAY X REQ PHY/QHP: CPT

## 2020-12-02 RX ORDER — DIPHENHYDRAMINE HYDROCHLORIDE 50 MG/ML
50 INJECTION INTRAMUSCULAR; INTRAVENOUS ONCE
Status: COMPLETED | OUTPATIENT
Start: 2020-12-02 | End: 2020-12-02

## 2020-12-02 RX ORDER — DEXAMETHASONE SODIUM PHOSPHATE 4 MG/ML
10 INJECTION, SOLUTION INTRA-ARTICULAR; INTRALESIONAL; INTRAMUSCULAR; INTRAVENOUS; SOFT TISSUE ONCE
Status: COMPLETED | OUTPATIENT
Start: 2020-12-02 | End: 2020-12-02

## 2020-12-02 RX ORDER — SODIUM CHLORIDE 9 MG/ML
20 INJECTION, SOLUTION INTRAVENOUS ONCE
Status: COMPLETED | OUTPATIENT
Start: 2020-12-02 | End: 2020-12-02

## 2020-12-02 RX ORDER — SODIUM CHLORIDE 0.9 % (FLUSH) 0.9 %
10 SYRINGE (ML) INJECTION PRN
Status: DISCONTINUED | OUTPATIENT
Start: 2020-12-02 | End: 2020-12-03 | Stop reason: HOSPADM

## 2020-12-02 RX ORDER — HEPARIN SODIUM (PORCINE) LOCK FLUSH IV SOLN 100 UNIT/ML 100 UNIT/ML
500 SOLUTION INTRAVENOUS PRN
Status: DISCONTINUED | OUTPATIENT
Start: 2020-12-02 | End: 2020-12-03 | Stop reason: HOSPADM

## 2020-12-02 RX ADMIN — Medication 500 UNITS: at 11:57

## 2020-12-02 RX ADMIN — DIPHENHYDRAMINE HYDROCHLORIDE 25 MG: 50 INJECTION, SOLUTION INTRAMUSCULAR; INTRAVENOUS at 10:17

## 2020-12-02 RX ADMIN — SODIUM CHLORIDE 20 ML/HR: 9 INJECTION, SOLUTION INTRAVENOUS at 10:08

## 2020-12-02 RX ADMIN — DEXAMETHASONE SODIUM PHOSPHATE 10 MG: 4 INJECTION, SOLUTION INTRAMUSCULAR; INTRAVENOUS at 10:09

## 2020-12-02 RX ADMIN — FAMOTIDINE 20 MG: 10 INJECTION INTRAVENOUS at 10:13

## 2020-12-02 RX ADMIN — Medication 10 ML: at 10:08

## 2020-12-02 RX ADMIN — PACLITAXEL 126 MG: 6 INJECTION, SOLUTION, CONCENTRATE INTRAVENOUS at 10:22

## 2020-12-02 RX ADMIN — Medication 10 ML: at 09:20

## 2020-12-02 RX ADMIN — Medication 10 ML: at 11:57

## 2020-12-02 RX ADMIN — Medication 10 ML: at 10:14

## 2020-12-02 NOTE — PROGRESS NOTES
Chemotherapy Administration    Pre-assessment Data: Antineoplastic Agents  Other:   See toxicity flow sheet for assessment [x]     Physician Notification of Concerns Related to Chemotherapy Administration:   Physician Notified Alba Hansen / Time of Notification      Interventions:   Lab work assessed  [x]   Height / Weight verified for dose [x]   Current MAR reviewed [x]   Emergency drugs available as appropriate [x]   Anaphylaxis assessment completed [x]   Pre-medications administered as ordered [x]   Blood return noted upon initiation of chemotherapy [x]   Blood return noted each 1-2ml of a vesicant medication if given IV push []   Blood return noted each 2-3ml of a non-vesicant medication if given IV push []   Monitor for signs / symptoms of hypersensitivity reaction [x]   Chemotherapy orders (drug/dose/rate) verified by 2 Chemo certified RNs [x]   Monitor IV site and blood return throughout the infusion of the medication [x]   Document IV site checks on the IV assessment form [x]   Document chemotherapy teaching on the Patient Education tab [x]   Document patient verbalizes understanding of medications being administered [x]   If IV infiltration, see ONS Guidelines []   Other:      []         Taxol Administration:  Taxol is filtered, use specific tubing for administration. If hypersensitivity reaction occurs, STOP TAXOL, maintain plain IV and notify physician for additional orders. Taxol is considered an irritant in low doses. High dose Taxol is considered a vesicant. Check with physician if infusion should be through a central line.    Neurological assessment completed pre administration [x]   Pre-medications administered as ordered [x]   Document baseline vital signs before administration []   For 3 hour Taxol: Document blood pressure, pulse, respiratory rate every 15 min times 1 hour after the start of Taxol []   For 1 hour Taxol: Document blood pressure, pulse, respiratory rate 15 min after the start of Taxol [x]   Document blood pressure, pulse, respiratory rate at the completion of Taxol [x]   Other:     []

## 2020-12-02 NOTE — PLAN OF CARE

## 2020-12-02 NOTE — PROGRESS NOTES
Patient assessed for the following post chemotherapy:    Dizziness   No  Lightheadedness  No      Acute nausea/vomiting No  Headache   No  Chest pain/pressure  No  Rash/itching   No  Shortness of breath  No    Patient kept for 20 minutes observation post infusion chemotherapy. Patient tolerated chemotherapy treatment with taxol  without any complications. Last vital signs:   BP (!) 96/51   Pulse 86   Temp 98.1 °F (36.7 °C) (Oral)   Resp 18   SpO2 100%     Patient instructed if experience any of the above symptoms following today's infusion,she is to notify MD immediately or go to the emergency department. Discharge instructions given to patient. Verbalizes understanding. Ambulated off unit per self  with belongings.  waiting in car to drive patient home

## 2020-12-02 NOTE — PATIENT INSTRUCTIONS
1.  Chemotherapy today. 2.  Chemotherapy on 12/09/2020 and 12/16/2020.  3.  Labs on 12/09/2020 and 12/16/2020.  4.  Labs on RTC. 5.  Chemotherapy on RTC.

## 2020-12-09 ENCOUNTER — HOSPITAL ENCOUNTER (OUTPATIENT)
Dept: INFUSION THERAPY | Age: 55
Discharge: HOME | End: 2020-12-09
Payer: COMMERCIAL

## 2020-12-09 VITALS
DIASTOLIC BLOOD PRESSURE: 59 MMHG | HEART RATE: 62 BPM | BODY MASS INDEX: 17.85 KG/M2 | SYSTOLIC BLOOD PRESSURE: 113 MMHG | WEIGHT: 117.8 LBS | RESPIRATION RATE: 18 BRPM | TEMPERATURE: 98 F | OXYGEN SATURATION: 100 % | HEIGHT: 68 IN

## 2020-12-09 DIAGNOSIS — C50.411 MALIGNANT NEOPLASM OF UPPER-OUTER QUADRANT OF RIGHT FEMALE BREAST, UNSPECIFIED ESTROGEN RECEPTOR STATUS (HCC): Primary | ICD-10-CM

## 2020-12-09 DIAGNOSIS — Z51.11 ENCOUNTER FOR CHEMOTHERAPY MANAGEMENT: ICD-10-CM

## 2020-12-09 LAB
ABSOLUTE IMMATURE GRANULOCYTE: 0.02 THOU/MM3 (ref 0–0.07)
BASINOPHIL, AUTOMATED: 1 % (ref 0–3)
BASOPHILS ABSOLUTE: 0 THOU/MM3 (ref 0–0.1)
BUN, WHOLE BLOOD: 9 MG/DL (ref 8–26)
CHLORIDE, WHOLE BLOOD: 105 MEQ/L (ref 98–109)
CREATININE, WHOLE BLOOD: 0.6 MG/DL (ref 0.5–1.2)
EOSINOPHILS ABSOLUTE: 0.1 THOU/MM3 (ref 0–0.4)
EOSINOPHILS RELATIVE PERCENT: 2 % (ref 0–4)
GFR, ESTIMATED ,CON: > 90 ML/MIN/1.73M2
GLUCOSE, WHOLE BLOOD: 89 MG/DL (ref 70–108)
HCT VFR BLD CALC: 34.5 % (ref 37–47)
HEMOGLOBIN: 11.4 GM/DL (ref 12–16)
IMMATURE GRANULOCYTES: 1 %
IONIZED CALCIUM, WHOLE BLOOD: 1.14 MMOL/L (ref 1.12–1.32)
LYMPHOCYTES # BLD: 35 % (ref 15–47)
LYMPHOCYTES ABSOLUTE: 1 THOU/MM3 (ref 1–4.8)
MCH RBC QN AUTO: 32.7 PG (ref 26–33)
MCHC RBC AUTO-ENTMCNC: 33 GM/DL (ref 32.2–35.5)
MCV RBC AUTO: 99 FL (ref 81–99)
MONOCYTES ABSOLUTE: 0.3 THOU/MM3 (ref 0.4–1.3)
MONOCYTES: 12 % (ref 0–12)
PDW BLD-RTO: 15.2 % (ref 11.5–14.5)
PLATELET # BLD: 247 THOU/MM3 (ref 130–400)
PMV BLD AUTO: 8.6 FL (ref 9.4–12.4)
POTASSIUM, WHOLE BLOOD: 4.1 MEQ/L (ref 3.5–4.9)
RBC # BLD: 3.49 MILL/MM3 (ref 4.2–5.4)
SEG NEUTROPHILS: 49 % (ref 43–75)
SEGMENTED NEUTROPHILS ABSOLUTE COUNT: 1.4 THOU/MM3 (ref 1.8–7.7)
SODIUM, WHOLE BLOOD: 141 MEQ/L (ref 138–146)
TOTAL CO2, WHOLE BLOOD: 27 MEQ/L (ref 23–33)
WBC # BLD: 2.8 THOU/MM3 (ref 4.8–10.8)

## 2020-12-09 PROCEDURE — 36591 DRAW BLOOD OFF VENOUS DEVICE: CPT

## 2020-12-09 PROCEDURE — 2500000003 HC RX 250 WO HCPCS: Performed by: INTERNAL MEDICINE

## 2020-12-09 PROCEDURE — 80047 BASIC METABLC PNL IONIZED CA: CPT

## 2020-12-09 PROCEDURE — 85025 COMPLETE CBC W/AUTO DIFF WBC: CPT

## 2020-12-09 PROCEDURE — 2580000003 HC RX 258: Performed by: INTERNAL MEDICINE

## 2020-12-09 PROCEDURE — 99211 OFF/OP EST MAY X REQ PHY/QHP: CPT

## 2020-12-09 PROCEDURE — 6360000002 HC RX W HCPCS: Performed by: INTERNAL MEDICINE

## 2020-12-09 PROCEDURE — 96413 CHEMO IV INFUSION 1 HR: CPT

## 2020-12-09 PROCEDURE — 96375 TX/PRO/DX INJ NEW DRUG ADDON: CPT

## 2020-12-09 RX ORDER — MEPERIDINE HYDROCHLORIDE 25 MG/ML
12.5 INJECTION INTRAMUSCULAR; INTRAVENOUS; SUBCUTANEOUS ONCE
Status: CANCELLED | OUTPATIENT
Start: 2020-12-09

## 2020-12-09 RX ORDER — DEXAMETHASONE SODIUM PHOSPHATE 4 MG/ML
10 INJECTION, SOLUTION INTRA-ARTICULAR; INTRALESIONAL; INTRAMUSCULAR; INTRAVENOUS; SOFT TISSUE ONCE
Status: COMPLETED | OUTPATIENT
Start: 2020-12-09 | End: 2020-12-09

## 2020-12-09 RX ORDER — SODIUM CHLORIDE 0.9 % (FLUSH) 0.9 %
10 SYRINGE (ML) INJECTION PRN
Status: DISCONTINUED | OUTPATIENT
Start: 2020-12-09 | End: 2020-12-10 | Stop reason: HOSPADM

## 2020-12-09 RX ORDER — HEPARIN SODIUM (PORCINE) LOCK FLUSH IV SOLN 100 UNIT/ML 100 UNIT/ML
500 SOLUTION INTRAVENOUS PRN
Status: DISCONTINUED | OUTPATIENT
Start: 2020-12-09 | End: 2020-12-10 | Stop reason: HOSPADM

## 2020-12-09 RX ORDER — SODIUM CHLORIDE 0.9 % (FLUSH) 0.9 %
5 SYRINGE (ML) INJECTION PRN
Status: CANCELLED | OUTPATIENT
Start: 2020-12-09

## 2020-12-09 RX ORDER — SODIUM CHLORIDE 9 MG/ML
20 INJECTION, SOLUTION INTRAVENOUS ONCE
Status: COMPLETED | OUTPATIENT
Start: 2020-12-09 | End: 2020-12-09

## 2020-12-09 RX ORDER — METHYLPREDNISOLONE SODIUM SUCCINATE 125 MG/2ML
125 INJECTION, POWDER, LYOPHILIZED, FOR SOLUTION INTRAMUSCULAR; INTRAVENOUS ONCE
Status: CANCELLED | OUTPATIENT
Start: 2020-12-09

## 2020-12-09 RX ORDER — DIPHENHYDRAMINE HYDROCHLORIDE 50 MG/ML
50 INJECTION INTRAMUSCULAR; INTRAVENOUS ONCE
Status: CANCELLED | OUTPATIENT
Start: 2020-12-09

## 2020-12-09 RX ORDER — SODIUM CHLORIDE 9 MG/ML
INJECTION, SOLUTION INTRAVENOUS CONTINUOUS
Status: CANCELLED | OUTPATIENT
Start: 2020-12-09

## 2020-12-09 RX ORDER — DIPHENHYDRAMINE HYDROCHLORIDE 50 MG/ML
50 INJECTION INTRAMUSCULAR; INTRAVENOUS ONCE
Status: COMPLETED | OUTPATIENT
Start: 2020-12-09 | End: 2020-12-09

## 2020-12-09 RX ADMIN — Medication 500 UNITS: at 10:35

## 2020-12-09 RX ADMIN — DEXAMETHASONE SODIUM PHOSPHATE 10 MG: 4 INJECTION, SOLUTION INTRAMUSCULAR; INTRAVENOUS at 08:55

## 2020-12-09 RX ADMIN — FAMOTIDINE 20 MG: 10 INJECTION INTRAVENOUS at 08:50

## 2020-12-09 RX ADMIN — PACLITAXEL 126 MG: 6 INJECTION, SOLUTION, CONCENTRATE INTRAVENOUS at 09:14

## 2020-12-09 RX ADMIN — Medication 10 ML: at 08:15

## 2020-12-09 RX ADMIN — SODIUM CHLORIDE 20 ML/HR: 9 INJECTION, SOLUTION INTRAVENOUS at 08:48

## 2020-12-09 RX ADMIN — Medication 10 ML: at 08:16

## 2020-12-09 RX ADMIN — Medication 10 ML: at 08:50

## 2020-12-09 RX ADMIN — DIPHENHYDRAMINE HYDROCHLORIDE 25 MG: 50 INJECTION, SOLUTION INTRAMUSCULAR; INTRAVENOUS at 08:53

## 2020-12-09 RX ADMIN — Medication 10 ML: at 10:35

## 2020-12-09 NOTE — PROGRESS NOTES
Patient assessed for the following post chemotherapy:    Dizziness   No  Lightheadedness  No      Acute nausea/vomiting No  Headache   No  Chest pain/pressure  No  Rash/itching   No  Shortness of breath  No    Patient kept for 20 minutes observation post infusion chemotherapy. Patient tolerated chemotherapy treatment taxol without any complications. Last vital signs:   BP (!) 113/59   Pulse 62   Temp 98 °F (36.7 °C) (Oral)   Resp 18   Ht 5' 8\" (1.727 m)   Wt 117 lb 12.8 oz (53.4 kg)   SpO2 100%   BMI 17.91 kg/m²         Patient instructed if experience any of the above symptoms following today's infusion,he/she is to notify MD immediately or go to the emergency department. Discharge instructions given to patient. Verbalizes understanding. Ambulated off unit per self with belongings.

## 2020-12-09 NOTE — PROGRESS NOTES
Chemotherapy Administration    Pre-assessment Data: Antineoplastic Agents  Other:   See toxicity flow sheet for assessment [x]     Physician Notification of Concerns Related to Chemotherapy Administration:   Physician Notified Melisa Pierson / Time of Notification      Interventions:   Lab work assessed  [x]   Height / Weight verified for dose [x]   Current MAR reviewed [x]   Emergency drugs available as appropriate [x]   Anaphylaxis assessment completed [x]   Pre-medications administered as ordered [x]   Blood return noted upon initiation of chemotherapy [x]   Blood return noted each 1-2ml of a vesicant medication if given IV push []   Blood return noted each 2-3ml of a non-vesicant medication if given IV push []   Monitor for signs / symptoms of hypersensitivity reaction [x]   Chemotherapy orders (drug/dose/rate) verified by 2 Chemo certified RNs [x]   Monitor IV site and blood return throughout the infusion of the medication [x]   Document IV site checks on the IV assessment form [x]   Document chemotherapy teaching on the Patient Education tab [x]   Document patient verbalizes understanding of medications being administered [x]   If IV infiltration, see ONS Guidelines []   Other:      []         Taxol Administration:  Taxol is filtered, use specific tubing for administration. If hypersensitivity reaction occurs, STOP TAXOL, maintain plain IV and notify physician for additional orders. Taxol is considered an irritant in low doses. High dose Taxol is considered a vesicant. Check with physician if infusion should be through a central line.    Neurological assessment completed pre administration [x]   Pre-medications administered as ordered [x]   Document baseline vital signs before administration [x]   For 3 hour Taxol: Document blood pressure, pulse, respiratory rate every 15 min times 1 hour after the start of Taxol []   For 1 hour Taxol: Document blood pressure, pulse, respiratory rate 15 min after the start of Taxol [x]   Document blood pressure, pulse, respiratory rate at the completion of Taxol [x]   Other:     []

## 2020-12-09 NOTE — PLAN OF CARE
Care plan reviewed with patient. Patient verbalized understanding of the plan of care and contribute to goal setting. Problem: Intellectual/Education/Knowledge Deficit  Goal: Teaching initiated upon admission  Outcome: Met This Shift  Note: Patient verbalizes understanding to verbal information given on taxol,action and possible side effects. Aware to call MD if develop complications. Intervention: Verbal/written education provided  Note: Chemotherapy Teaching     What is Chemotherapy   Drug action [x]   Method of Administration [x]   Handouts given []     Side Effects  Nausea/vomiting [x]   Diarrhea [x]   Fatigue [x]   Signs / Symptoms of infection [x]   Neutropenia [x]   Thrombocytopenia [x]   Alopecia [x]   neuropathy [x]   [place] diet &  the importance of fluids [x]       Micellaneous  Importance of nutrition [x]   Importance of oral hygiene [x]   When to call the MD [x]   Monitoring labs [x]   Use of supportive services []     Explanation of Drug Regimen / Frequency  taxol     Comments  Verbalized understanding to drug,action,side effects and when to call MD         Problem: Discharge Planning  Goal: Knowledge of discharge instructions  Description: Knowledge of discharge instructions  Outcome: Met This Shift  Note: Verbalized understanding of discharge instructions, follow ups and when to call doctor   Intervention: Discharge to appropriate level of care  Note: Discuss discharge instructions, follow ups and when to call doctor. Problem: Falls - Risk of:  Goal: Will remain free from falls  Description: Will remain free from falls  Outcome: Met This Shift  Note: Free from falls while in infusion center.  Verbalized understanding of fall prevention and to ask for assistance with ambulation   Intervention: Assess risk factors for falls  Description: Assess risk factors for falls  Note: Assess for fall risk, instruct to ask for assistance with ambulation      Problem: Infection - Central Venous Catheter-Associated Bloodstream Infection:  Goal: Will show no infection signs and symptoms  Description: Will show no infection signs and symptoms  Outcome: Met This Shift  Note: Mediport site with no redness or warmth. Skin over port intact with no signs of breakdown noted. Patient verbalizes signs/symptoms of port infection and when to notify the physician.     Intervention: Infection risk assessment  Description: Infection risk assessment  Note: Discuss port maintenance, infection prevention, signs and when to call

## 2020-12-10 RX ORDER — EPINEPHRINE 1 MG/ML
0.3 INJECTION, SOLUTION, CONCENTRATE INTRAVENOUS PRN
Status: CANCELLED | OUTPATIENT
Start: 2020-12-23

## 2020-12-10 RX ORDER — SODIUM CHLORIDE 9 MG/ML
20 INJECTION, SOLUTION INTRAVENOUS ONCE
Status: CANCELLED | OUTPATIENT
Start: 2020-12-16

## 2020-12-10 RX ORDER — EPINEPHRINE 1 MG/ML
0.3 INJECTION, SOLUTION, CONCENTRATE INTRAVENOUS PRN
Status: CANCELLED | OUTPATIENT
Start: 2020-12-30

## 2020-12-10 RX ORDER — DIPHENHYDRAMINE HYDROCHLORIDE 50 MG/ML
50 INJECTION INTRAMUSCULAR; INTRAVENOUS ONCE
Status: CANCELLED | OUTPATIENT
Start: 2020-12-16

## 2020-12-10 RX ORDER — HEPARIN SODIUM (PORCINE) LOCK FLUSH IV SOLN 100 UNIT/ML 100 UNIT/ML
500 SOLUTION INTRAVENOUS PRN
Status: CANCELLED | OUTPATIENT
Start: 2020-12-23

## 2020-12-10 RX ORDER — MEPERIDINE HYDROCHLORIDE 50 MG/ML
12.5 INJECTION INTRAMUSCULAR; INTRAVENOUS; SUBCUTANEOUS ONCE
Status: CANCELLED | OUTPATIENT
Start: 2020-12-23

## 2020-12-10 RX ORDER — MEPERIDINE HYDROCHLORIDE 50 MG/ML
12.5 INJECTION INTRAMUSCULAR; INTRAVENOUS; SUBCUTANEOUS ONCE
Status: CANCELLED | OUTPATIENT
Start: 2020-12-16

## 2020-12-10 RX ORDER — SODIUM CHLORIDE 9 MG/ML
INJECTION, SOLUTION INTRAVENOUS CONTINUOUS
Status: CANCELLED | OUTPATIENT
Start: 2020-12-30

## 2020-12-10 RX ORDER — SODIUM CHLORIDE 9 MG/ML
20 INJECTION, SOLUTION INTRAVENOUS ONCE
Status: CANCELLED | OUTPATIENT
Start: 2020-12-30

## 2020-12-10 RX ORDER — SODIUM CHLORIDE 0.9 % (FLUSH) 0.9 %
10 SYRINGE (ML) INJECTION PRN
Status: CANCELLED | OUTPATIENT
Start: 2020-12-16

## 2020-12-10 RX ORDER — SODIUM CHLORIDE 0.9 % (FLUSH) 0.9 %
5 SYRINGE (ML) INJECTION PRN
Status: CANCELLED | OUTPATIENT
Start: 2020-12-16

## 2020-12-10 RX ORDER — HEPARIN SODIUM (PORCINE) LOCK FLUSH IV SOLN 100 UNIT/ML 100 UNIT/ML
500 SOLUTION INTRAVENOUS PRN
Status: CANCELLED | OUTPATIENT
Start: 2020-12-16

## 2020-12-10 RX ORDER — MEPERIDINE HYDROCHLORIDE 50 MG/ML
12.5 INJECTION INTRAMUSCULAR; INTRAVENOUS; SUBCUTANEOUS ONCE
Status: CANCELLED | OUTPATIENT
Start: 2020-12-30

## 2020-12-10 RX ORDER — DIPHENHYDRAMINE HYDROCHLORIDE 50 MG/ML
50 INJECTION INTRAMUSCULAR; INTRAVENOUS ONCE
Status: CANCELLED | OUTPATIENT
Start: 2020-12-23

## 2020-12-10 RX ORDER — METHYLPREDNISOLONE SODIUM SUCCINATE 125 MG/2ML
125 INJECTION, POWDER, LYOPHILIZED, FOR SOLUTION INTRAMUSCULAR; INTRAVENOUS ONCE
Status: CANCELLED | OUTPATIENT
Start: 2020-12-16

## 2020-12-10 RX ORDER — SODIUM CHLORIDE 0.9 % (FLUSH) 0.9 %
5 SYRINGE (ML) INJECTION PRN
Status: CANCELLED | OUTPATIENT
Start: 2020-12-30

## 2020-12-10 RX ORDER — DIPHENHYDRAMINE HYDROCHLORIDE 50 MG/ML
50 INJECTION INTRAMUSCULAR; INTRAVENOUS ONCE
Status: CANCELLED | OUTPATIENT
Start: 2020-12-30

## 2020-12-10 RX ORDER — SODIUM CHLORIDE 9 MG/ML
20 INJECTION, SOLUTION INTRAVENOUS ONCE
Status: CANCELLED | OUTPATIENT
Start: 2020-12-23

## 2020-12-10 RX ORDER — SODIUM CHLORIDE 9 MG/ML
INJECTION, SOLUTION INTRAVENOUS CONTINUOUS
Status: CANCELLED | OUTPATIENT
Start: 2020-12-23

## 2020-12-10 RX ORDER — DEXAMETHASONE SODIUM PHOSPHATE 4 MG/ML
10 INJECTION, SOLUTION INTRA-ARTICULAR; INTRALESIONAL; INTRAMUSCULAR; INTRAVENOUS; SOFT TISSUE ONCE
Status: CANCELLED | OUTPATIENT
Start: 2020-12-23

## 2020-12-10 RX ORDER — SODIUM CHLORIDE 9 MG/ML
INJECTION, SOLUTION INTRAVENOUS CONTINUOUS
Status: CANCELLED | OUTPATIENT
Start: 2020-12-16

## 2020-12-10 RX ORDER — METHYLPREDNISOLONE SODIUM SUCCINATE 125 MG/2ML
125 INJECTION, POWDER, LYOPHILIZED, FOR SOLUTION INTRAMUSCULAR; INTRAVENOUS ONCE
Status: CANCELLED | OUTPATIENT
Start: 2020-12-30

## 2020-12-10 RX ORDER — EPINEPHRINE 1 MG/ML
0.3 INJECTION, SOLUTION, CONCENTRATE INTRAVENOUS PRN
Status: CANCELLED | OUTPATIENT
Start: 2020-12-16

## 2020-12-10 RX ORDER — HEPARIN SODIUM (PORCINE) LOCK FLUSH IV SOLN 100 UNIT/ML 100 UNIT/ML
500 SOLUTION INTRAVENOUS PRN
Status: CANCELLED | OUTPATIENT
Start: 2020-12-30

## 2020-12-10 RX ORDER — SODIUM CHLORIDE 0.9 % (FLUSH) 0.9 %
10 SYRINGE (ML) INJECTION PRN
Status: CANCELLED | OUTPATIENT
Start: 2020-12-23

## 2020-12-10 RX ORDER — METHYLPREDNISOLONE SODIUM SUCCINATE 125 MG/2ML
125 INJECTION, POWDER, LYOPHILIZED, FOR SOLUTION INTRAMUSCULAR; INTRAVENOUS ONCE
Status: CANCELLED | OUTPATIENT
Start: 2020-12-23

## 2020-12-10 RX ORDER — SODIUM CHLORIDE 0.9 % (FLUSH) 0.9 %
5 SYRINGE (ML) INJECTION PRN
Status: CANCELLED | OUTPATIENT
Start: 2020-12-23

## 2020-12-10 RX ORDER — SODIUM CHLORIDE 0.9 % (FLUSH) 0.9 %
10 SYRINGE (ML) INJECTION PRN
Status: CANCELLED | OUTPATIENT
Start: 2020-12-30

## 2020-12-12 NOTE — PROGRESS NOTES
[facility] CANCER CENTER  CANCER NETWORK OF [facility]  [facility] [address] [facility] [address]  [address], [facility] [address] [facility] [address]  Dept: [phone]  Dept Fax: [phone]: [phone]     Referring Physician:  Dr. Álvaro Paiz    Subjective: Chief Complaint:  Prasanna Knutson is a 54 y.o. with breast cancer. The patient is a 57-year-old postmenopausal female with breast cancer located in the right breast.  She was originally diagnosed with breast cancer in December 2009 also involving the right breast.  At that time she was noted to have an invasive ductal carcinoma that was estrogen receptor positive, progesterone separate positive, and HER-2/natty overexpression was negative. She underwent a lumpectomy of the right breast with sentinel node biopsy. Pathology confirmed the invasive ductal carcinoma and one sentinel lymph node was negative for malignancy. Her Oncotype DX score was 18 and therefore she did not receive adjuvant chemotherapy treatment. At the time of her initial diagnosis, she was premenopausal and therefore was placed on tamoxifen therapy. She completed a 5-year course of anti-estrogen therapy. In August 2020, the patient developed a palpable right axillary mass. In context of this condition, the patient had a mammogram and ultrasound completed on August 11, 2020. This confirmed a 2.5 cm right axillary mass with a 1.5 cm adjacent lymph node. Other than the palpable abnormality the patient had no signs or symptoms associated with the mass or other evidence of breast cancer. A modifying factor affecting this condition is that on August 18, 2020 the patient had a biopsy of the axillary mass completed. This confirmed invasive ductal carcinoma which was as receptor positive, progestin receptor positive and HER-2/natty overexpression was negative by FISH. The Ki-67 was 30%. She was then seen by Dr. Zohaib Howe at [facility]. CT scans of the chest abdomen and pelvis were completed as well as a bone scan. These did not find any definitive evidence of distant metastatic disease. There were several cyst and a hemangioma noted in the liver.   On September 20 of 2020 a follow-up breast MRI was completed which found a much larger mass than previously noted which abutted and may involve the lateral and posterior borders of the pectoralis minor muscle. No left axillary or internal mammary chain lymphadenopathy was identified. The patient was reviewed at the breast cancer multimodality conference at [facility] and received a recommendation of receiving neoadjuvant chemotherapy with dose dense Adriamycin/Cytoxan followed by a taxane. HPI:    Damon De Paz is here today for follow-up regarding her history of recurrent breast cancer. The patient is receiving neoadjuvant chemotherapy treatment and has completed therapy with Adriamycin and Cytoxan. She has also now completed 3 cycles of chemotherapy with Taxol. The patient is tolerating this well without significant side effects or complications. She continues to have some mild lymphadenopathy in the left axillary region that is tender to palpation. The patient has no symptoms of distant metastatic disease. She has not had fever, cough, shortness of breath or other signs of infection. The patient's bowel and bladder habits have been normal.  She has not seen blood in her stool or urine. The patient remains active with an ECOG performance status of level 0. She has mild leukopenia secondary to chemotherapy treatment. The patient does not have any symptoms specifically related to her low white blood cell count. The patient also has chemotherapy-induced anemia. She does not report any clear evidence of abnormal blood loss. PMH, SH, and FH:  I reviewed the patients medication list and allergy list as noted on the electronic medical record. The PMH, SH and FH were also reviewed as noted on the EMR. Review of Systems  Constitutional: Negative. HENT: Negative. Eyes: Negative. Respiratory: Negative. Cardiovascular: Negative. Gastrointestinal: Negative. Genitourinary: Negative. Musculoskeletal: Negative. Skin: Negative. Neurological: Negative. Hematological: Negative. Psychiatric/Behavioral: Negative. Objective:   Physical Exam  Vitals:    12/02/20 1045   BP: (!) 114/56   Pulse: 77   Resp: 18   Temp: 97.7 °F (36.5 °C)   SpO2: 100%   Vitals reviewed and are stable. Constitutional: Well-developed. No acute distress. HENT: Normocephalic and atraumatic. Eyes: Pupils appear equal. No scleral icterus. Neck: Overall appearance is symmetrical. No identifiable masses. Chest: Mediport in place in the upper chest. Appears stable. Pulmonary: Effort normal. No respiratory distress. Musculoskeletal: Gait is normal. Muscle strength and tone grossly good. Neurological: Alert and oriented to person, place, and time. Judgment and thought content normal.  Skin: Skin is warm and dry. No rash. Psychiatric: Mood and affect appropriate for the clinical situation. Data Analysis:    Hematology 12/9/2020 12/2/2020 11/25/2020   WBC 2.8 (L) 2.9 (L) 2.7 (L)   RBC 3.49 (L) 3.24 (L) 3.22 (L)   HGB 11.4 (L) 10.6 (L) 10.4 (L)   HCT 34.5 (L) 32.1 (L) 31.5 (L)   MCV 99 99 98   RDW 15.2 (H) 16.0 (H) 16.5 (H)    233 272     Assessment:   1. Recurrent invasive ductal carcinoma of the right breast.  2.  Leukopenia. 3.  Anemia. 4.  Chemotherapy encounter. Recommendations:   1. Proceed with cycle #4 of Adriamycin and Cytoxan combination chemotherapy. 2.  Monitor total WBC count and for signs of infection/fever. 3.  Monitor hemoglobin/hematocrit and for any signs of blood loss. 4.  Monitor for side effects and toxicity from chemotherapy. 5.  Monitor for response of malignancy. Julissa Carrillo M.D.                                                                          Medical Director: [facility]  Cancer [address] [facility], [address] [place] [address], [address] [place] [id number] JACKY Pinson, [place] of the [facility] at the [facility]      **This report has been created using voice recognition software. It may contain minor errors which are inherent in voice recognition technology. **

## 2020-12-13 PROCEDURE — 99215 OFFICE O/P EST HI 40 MIN: CPT | Performed by: INTERNAL MEDICINE

## 2020-12-13 NOTE — PROGRESS NOTES
[facility] CANCER CENTER  CANCER NETWORK OF [facility]  [facility] [address] [facility] 98  [address], [facility] [address] [facility] [address]  Dept: [phone]  Dept Fax: [phone]: [phone]     Referring Physician:  Dr. Lele Sidhu    Subjective: Chief Complaint:  Tyrone Oseguera is a 54 y.o. with breast cancer. The patient is a 59-year-old postmenopausal female with breast cancer located in the right breast.  She was originally diagnosed with breast cancer in December 2009 also involving the right breast.  At that time she was noted to have an invasive ductal carcinoma that was estrogen receptor positive, progesterone separate positive, and HER-2/natty overexpression was negative. She underwent a lumpectomy of the right breast with sentinel node biopsy. Pathology confirmed the invasive ductal carcinoma and one sentinel lymph node was negative for malignancy. Her Oncotype DX score was 18 and therefore she did not receive adjuvant chemotherapy treatment. At the time of her initial diagnosis, she was premenopausal and therefore was placed on tamoxifen therapy. She completed a 5-year course of anti-estrogen therapy. In August 2020, the patient developed a palpable right axillary mass. In context of this condition, the patient had a mammogram and ultrasound completed on August 11, 2020. This confirmed a 2.5 cm right axillary mass with a 1.5 cm adjacent lymph node. Other than the palpable abnormality the patient had no signs or symptoms associated with the mass or other evidence of breast cancer. A modifying factor affecting this condition is that on August 18, 2020 the patient had a biopsy of the axillary mass completed. This confirmed invasive ductal carcinoma which was as receptor positive, progestin receptor positive and HER-2/natty overexpression was negative by FISH. The Ki-67 was 30%. She was then seen by Dr. Rigoberto Mittal at [facility]. CT scans of the chest abdomen and pelvis were completed as well as a bone scan. These did not find any definitive evidence of distant metastatic disease. There were several cyst and a hemangioma noted in the liver.   On September 20 of 2020 a follow-up breast MRI was completed which found a much larger mass than previously noted which abutted and may involve the lateral and posterior borders of the pectoralis minor muscle. No left axillary or internal mammary chain lymphadenopathy was identified. The patient was reviewed at the breast cancer multimodality conference at [place] and received a recommendation of receiving neoadjuvant chemotherapy with dose dense Adriamycin/Cytoxan followed by a taxane. HPI:    The patient is here today for follow-up regarding her history of recurrent breast cancer. The patient continues to receive neoadjuvant chemotherapy treatment. She completed 4 cycles of Adriamycin and Cytoxan and now has completed 6 weekly doses of Taxol therapy of a planned 12 treatments. Her general overall condition has remained stable. The patient was evaluated by her surgeon last week at [facility] and has upcoming MRI and scans scheduled. She does not report any grade 3 or grade 4 toxicity related to chemotherapy treatment. The patient denies nausea, vomiting, fever, shortness of breath, cough, or other signs of infection. Her bowel and bladder habits have both been fairly stable. She does have mild leukopenia but does not have symptoms related to her low white blood cell count. The patient has also mild anemia but does not have evidence of blood loss. She denies seeing blood in her stool or urine. Her leukopenia and her anemia are both likely secondary to her chemotherapy treatment. The patient remains active with an ECOG performance status of level 0. PMH, SH, and FH:  I reviewed the patients medication list and allergy list as noted on the electronic medical record. The PMH, SH and FH were also reviewed as noted on the EMR. Review of Systems  Constitutional: Negative. HENT: Negative. Eyes: Negative. Respiratory: Negative. Cardiovascular: Negative. Gastrointestinal: Negative. Genitourinary: Negative. Musculoskeletal: Negative. Skin: Negative. Neurological: Negative. Hematological: Negative. Psychiatric/Behavioral: Negative. Objective:   Physical Exam  Vitals:    12/23/20 0845   BP: (!) 106/53   Pulse: 72   Resp: 16   Temp: 97.3 °F (36.3 °C)   SpO2: 100%   Vitals reviewed and are stable. Constitutional: Well-developed. No acute distress. HENT: Normocephalic and atraumatic. Eyes: PEERL. No scleral icterus. Chest: Mediport in place in the upper chest. Appears stable. Pulmonary: Effort normal. No respiratory distress. Musculoskeletal: Gait is normal. Muscle strength and tone grossly good. Neurological: Alert and oriented to person, place, and time. Judgment and thought content normal.  Skin: Skin appears warm and dry. No rash. Psychiatric: Mood and affect appropriate for the clinical situation. Data Analysis:    Hematology 12/23/2020 12/16/2020 12/9/2020   WBC 2.8 (L) 2.4 (L) 2.8 (L)   RBC 3.26 (L) 3.33 (L) 3.49 (L)   HGB 10.8 (L) 10.9 (L) 11.4 (L)   HCT 32.4 (L) 33.1 (L) 34.5 (L)   MCV 99 99 99   RDW 13.9 14.7 (H) 15.2 (H)    251 247     Assessment:   1. Recurrent invasive ductal carcinoma of the right breast.  2.  Leukopenia. 3.  Anemia. 4.  Chemotherapy encounter. Recommendations:   1. Proceed with cycle #7 of Taxol chemotherapy. 2.  Monitor total WBC count and for signs of infection/fever. 3.  Monitor hemoglobin/hematocrit and for any signs of blood loss. 4.  Monitor for side effects and toxicity from chemotherapy. 5.  Monitor for response of malignancy. Ellamae Burkitt M.D.                                                                          Medical Director: [facility]  Cancer [facility]  [address], 1 [facility], [address] [place] [address], [address] [place] [address] Affiliate of the [facility] at [facility]      **This report has been created using voice recognition software. It may contain minor errors which are inherent in voice recognition technology. **

## 2020-12-16 ENCOUNTER — HOSPITAL ENCOUNTER (OUTPATIENT)
Dept: INFUSION THERAPY | Age: 55
Discharge: HOME | End: 2020-12-16
Payer: COMMERCIAL

## 2020-12-16 VITALS
HEART RATE: 79 BPM | OXYGEN SATURATION: 99 % | HEIGHT: 68 IN | TEMPERATURE: 98 F | RESPIRATION RATE: 18 BRPM | DIASTOLIC BLOOD PRESSURE: 58 MMHG | WEIGHT: 117.6 LBS | SYSTOLIC BLOOD PRESSURE: 111 MMHG | BODY MASS INDEX: 17.82 KG/M2

## 2020-12-16 DIAGNOSIS — C50.411 MALIGNANT NEOPLASM OF UPPER-OUTER QUADRANT OF RIGHT FEMALE BREAST, UNSPECIFIED ESTROGEN RECEPTOR STATUS (HCC): Primary | ICD-10-CM

## 2020-12-16 DIAGNOSIS — Z51.11 ENCOUNTER FOR CHEMOTHERAPY MANAGEMENT: ICD-10-CM

## 2020-12-16 LAB
ABSOLUTE IMMATURE GRANULOCYTE: 0.01 THOU/MM3 (ref 0–0.07)
BASINOPHIL, AUTOMATED: 1 % (ref 0–3)
BASOPHILS ABSOLUTE: 0 THOU/MM3 (ref 0–0.1)
BUN, WHOLE BLOOD: 9 MG/DL (ref 8–26)
CHLORIDE, WHOLE BLOOD: 107 MEQ/L (ref 98–109)
CREATININE, WHOLE BLOOD: 0.7 MG/DL (ref 0.5–1.2)
EOSINOPHILS ABSOLUTE: 0 THOU/MM3 (ref 0–0.4)
EOSINOPHILS RELATIVE PERCENT: 2 % (ref 0–4)
GFR, ESTIMATED ,CON: > 90 ML/MIN/1.73M2
GLUCOSE, WHOLE BLOOD: 92 MG/DL (ref 70–108)
HCT VFR BLD CALC: 33.1 % (ref 37–47)
HEMOGLOBIN: 10.9 GM/DL (ref 12–16)
IMMATURE GRANULOCYTES: 0 %
IONIZED CALCIUM, WHOLE BLOOD: 1.16 MMOL/L (ref 1.12–1.32)
LYMPHOCYTES # BLD: 36 % (ref 15–47)
LYMPHOCYTES ABSOLUTE: 0.9 THOU/MM3 (ref 1–4.8)
MCH RBC QN AUTO: 32.7 PG (ref 26–33)
MCHC RBC AUTO-ENTMCNC: 32.9 GM/DL (ref 32.2–35.5)
MCV RBC AUTO: 99 FL (ref 81–99)
MONOCYTES ABSOLUTE: 0.3 THOU/MM3 (ref 0.4–1.3)
MONOCYTES: 14 % (ref 0–12)
PDW BLD-RTO: 14.7 % (ref 11.5–14.5)
PLATELET # BLD: 251 THOU/MM3 (ref 130–400)
PMV BLD AUTO: 8.5 FL (ref 9.4–12.4)
POTASSIUM, WHOLE BLOOD: 4.4 MEQ/L (ref 3.5–4.9)
RBC # BLD: 3.33 MILL/MM3 (ref 4.2–5.4)
SEG NEUTROPHILS: 47 % (ref 43–75)
SEGMENTED NEUTROPHILS ABSOLUTE COUNT: 1.1 THOU/MM3 (ref 1.8–7.7)
SODIUM, WHOLE BLOOD: 141 MEQ/L (ref 138–146)
TOTAL CO2, WHOLE BLOOD: 27 MEQ/L (ref 23–33)
WBC # BLD: 2.4 THOU/MM3 (ref 4.8–10.8)

## 2020-12-16 PROCEDURE — 96413 CHEMO IV INFUSION 1 HR: CPT

## 2020-12-16 PROCEDURE — 2500000003 HC RX 250 WO HCPCS: Performed by: INTERNAL MEDICINE

## 2020-12-16 PROCEDURE — 2580000003 HC RX 258: Performed by: INTERNAL MEDICINE

## 2020-12-16 PROCEDURE — 6360000002 HC RX W HCPCS: Performed by: INTERNAL MEDICINE

## 2020-12-16 PROCEDURE — 36591 DRAW BLOOD OFF VENOUS DEVICE: CPT

## 2020-12-16 PROCEDURE — 96375 TX/PRO/DX INJ NEW DRUG ADDON: CPT

## 2020-12-16 PROCEDURE — 85025 COMPLETE CBC W/AUTO DIFF WBC: CPT

## 2020-12-16 PROCEDURE — 80047 BASIC METABLC PNL IONIZED CA: CPT

## 2020-12-16 RX ORDER — HEPARIN SODIUM (PORCINE) LOCK FLUSH IV SOLN 100 UNIT/ML 100 UNIT/ML
500 SOLUTION INTRAVENOUS PRN
Status: DISCONTINUED | OUTPATIENT
Start: 2020-12-16 | End: 2020-12-17 | Stop reason: HOSPADM

## 2020-12-16 RX ORDER — SODIUM CHLORIDE 0.9 % (FLUSH) 0.9 %
10 SYRINGE (ML) INJECTION PRN
Status: DISCONTINUED | OUTPATIENT
Start: 2020-12-16 | End: 2020-12-17 | Stop reason: HOSPADM

## 2020-12-16 RX ORDER — SODIUM CHLORIDE 9 MG/ML
20 INJECTION, SOLUTION INTRAVENOUS ONCE
Status: COMPLETED | OUTPATIENT
Start: 2020-12-16 | End: 2020-12-16

## 2020-12-16 RX ORDER — DIPHENHYDRAMINE HYDROCHLORIDE 50 MG/ML
50 INJECTION INTRAMUSCULAR; INTRAVENOUS ONCE
Status: COMPLETED | OUTPATIENT
Start: 2020-12-16 | End: 2020-12-16

## 2020-12-16 RX ORDER — DEXAMETHASONE SODIUM PHOSPHATE 4 MG/ML
10 INJECTION, SOLUTION INTRA-ARTICULAR; INTRALESIONAL; INTRAMUSCULAR; INTRAVENOUS; SOFT TISSUE ONCE
Status: COMPLETED | OUTPATIENT
Start: 2020-12-16 | End: 2020-12-16

## 2020-12-16 RX ADMIN — Medication 10 ML: at 08:06

## 2020-12-16 RX ADMIN — Medication 10 ML: at 08:36

## 2020-12-16 RX ADMIN — FAMOTIDINE 20 MG: 10 INJECTION INTRAVENOUS at 08:36

## 2020-12-16 RX ADMIN — DIPHENHYDRAMINE HYDROCHLORIDE 25 MG: 50 INJECTION, SOLUTION INTRAMUSCULAR; INTRAVENOUS at 08:40

## 2020-12-16 RX ADMIN — Medication 500 UNITS: at 10:13

## 2020-12-16 RX ADMIN — Medication 10 ML: at 08:05

## 2020-12-16 RX ADMIN — PACLITAXEL 126 MG: 6 INJECTION, SOLUTION, CONCENTRATE INTRAVENOUS at 08:51

## 2020-12-16 RX ADMIN — Medication 10 ML: at 08:35

## 2020-12-16 RX ADMIN — Medication 10 ML: at 10:13

## 2020-12-16 RX ADMIN — SODIUM CHLORIDE 20 ML/HR: 9 INJECTION, SOLUTION INTRAVENOUS at 08:35

## 2020-12-16 RX ADMIN — DEXAMETHASONE SODIUM PHOSPHATE 10 MG: 4 INJECTION, SOLUTION INTRAMUSCULAR; INTRAVENOUS at 08:43

## 2020-12-16 NOTE — PROGRESS NOTES
Patient assessed for the following post chemotherapy:    Dizziness   No  Lightheadedness  No      Acute nausea/vomiting No  Headache   No  Chest pain/pressure  No  Rash/itching   No  Shortness of breath  No    Patient kept for 20 minutes observation post infusion chemotherapy. Patient tolerated chemotherapy treatment taxol without any complications. Last vital signs:   BP (!) 111/58   Pulse 79   Temp 98 °F (36.7 °C) (Oral)   Resp 18   Ht 5' 8\" (1.727 m)   Wt 117 lb 9.6 oz (53.3 kg)   SpO2 99%   BMI 17.88 kg/m²     Patient instructed if experience any of the above symptoms following today's infusion,she is to notify MD immediately or go to the emergency department. Discharge instructions given to patient. Verbalizes understanding. Ambulated off unit per self with belongings.

## 2020-12-16 NOTE — PLAN OF CARE

## 2020-12-16 NOTE — PROGRESS NOTES
Chemotherapy Administration    Pre-assessment Data: Antineoplastic Agents  Other:   See toxicity flow sheet for assessment [x]     Physician Notification of Concerns Related to Chemotherapy Administration:   Physician Notified Castro Perez / Time of Notification      Interventions:   Lab work assessed  [x]   Height / Weight verified for dose [x]   Current MAR reviewed [x]   Emergency drugs available as appropriate [x]   Anaphylaxis assessment completed [x]   Pre-medications administered as ordered [x]   Blood return noted upon initiation of chemotherapy [x]   Blood return noted each 1-2ml of a vesicant medication if given IV push []   Blood return noted each 2-3ml of a non-vesicant medication if given IV push []   Monitor for signs / symptoms of hypersensitivity reaction [x]   Chemotherapy orders (drug/dose/rate) verified by 2 Chemo certified RNs [x]   Monitor IV site and blood return throughout the infusion of the medication [x]   Document IV site checks on the IV assessment form [x]   Document chemotherapy teaching on the Patient Education tab [x]   Document patient verbalizes understanding of medications being administered [x]   If IV infiltration, see ONS Guidelines []   Other:      []         Taxol Administration:  Taxol is filtered, use specific tubing for administration. If hypersensitivity reaction occurs, STOP TAXOL, maintain plain IV and notify physician for additional orders. Taxol is considered an irritant in low doses. High dose Taxol is considered a vesicant. Check with physician if infusion should be through a central line.    Neurological assessment completed pre administration [x]   Pre-medications administered as ordered [x]   Document baseline vital signs before administration [x]   For 3 hour Taxol: Document blood pressure, pulse, respiratory rate every 15 min times 1 hour after the start of Taxol []   For 1 hour Taxol: Document blood pressure, pulse, respiratory rate 15 min after the start of Taxol [x]   Document blood pressure, pulse, respiratory rate at the completion of Taxol [x]   Other:     []

## 2020-12-23 ENCOUNTER — OFFICE VISIT (OUTPATIENT)
Dept: ONCOLOGY | Age: 55
End: 2020-12-23
Payer: COMMERCIAL

## 2020-12-23 ENCOUNTER — HOSPITAL ENCOUNTER (OUTPATIENT)
Dept: INFUSION THERAPY | Age: 55
Discharge: HOME | End: 2020-12-23
Payer: COMMERCIAL

## 2020-12-23 VITALS
SYSTOLIC BLOOD PRESSURE: 96 MMHG | OXYGEN SATURATION: 98 % | RESPIRATION RATE: 18 BRPM | TEMPERATURE: 98.3 F | DIASTOLIC BLOOD PRESSURE: 60 MMHG | HEART RATE: 82 BPM

## 2020-12-23 VITALS
HEIGHT: 68 IN | BODY MASS INDEX: 18.07 KG/M2 | TEMPERATURE: 97.3 F | DIASTOLIC BLOOD PRESSURE: 53 MMHG | WEIGHT: 119.2 LBS | OXYGEN SATURATION: 100 % | SYSTOLIC BLOOD PRESSURE: 106 MMHG | RESPIRATION RATE: 16 BRPM | HEART RATE: 72 BPM

## 2020-12-23 DIAGNOSIS — C50.411 MALIGNANT NEOPLASM OF UPPER-OUTER QUADRANT OF RIGHT FEMALE BREAST, UNSPECIFIED ESTROGEN RECEPTOR STATUS (HCC): ICD-10-CM

## 2020-12-23 DIAGNOSIS — Z51.11 ENCOUNTER FOR CHEMOTHERAPY MANAGEMENT: Primary | ICD-10-CM

## 2020-12-23 LAB
ABSOLUTE IMMATURE GRANULOCYTE: 0.01 THOU/MM3 (ref 0–0.07)
ALBUMIN SERPL-MCNC: 4.2 G/DL (ref 3.5–5.1)
ALP BLD-CCNC: 62 U/L (ref 38–126)
ALT SERPL-CCNC: 15 U/L (ref 11–66)
AST SERPL-CCNC: 22 U/L (ref 5–40)
BASINOPHIL, AUTOMATED: 1 % (ref 0–3)
BASOPHILS ABSOLUTE: 0 THOU/MM3 (ref 0–0.1)
BILIRUB SERPL-MCNC: 0.2 MG/DL (ref 0.3–1.2)
BILIRUBIN DIRECT: < 0.2 MG/DL (ref 0–0.3)
BUN, WHOLE BLOOD: 11 MG/DL (ref 8–26)
CHLORIDE, WHOLE BLOOD: 105 MEQ/L (ref 98–109)
CREATININE, WHOLE BLOOD: 0.6 MG/DL (ref 0.5–1.2)
EOSINOPHILS ABSOLUTE: 0 THOU/MM3 (ref 0–0.4)
EOSINOPHILS RELATIVE PERCENT: 1 % (ref 0–4)
GFR, ESTIMATED ,CON: > 90 ML/MIN/1.73M2
GLUCOSE, WHOLE BLOOD: 86 MG/DL (ref 70–108)
HCT VFR BLD CALC: 32.4 % (ref 37–47)
HEMOGLOBIN: 10.8 GM/DL (ref 12–16)
IMMATURE GRANULOCYTES: 0 %
IONIZED CALCIUM, WHOLE BLOOD: 1.17 MMOL/L (ref 1.12–1.32)
LYMPHOCYTES # BLD: 38 % (ref 15–47)
LYMPHOCYTES ABSOLUTE: 1 THOU/MM3 (ref 1–4.8)
MCH RBC QN AUTO: 33.1 PG (ref 26–33)
MCHC RBC AUTO-ENTMCNC: 33.3 GM/DL (ref 32.2–35.5)
MCV RBC AUTO: 99 FL (ref 81–99)
MONOCYTES ABSOLUTE: 0.3 THOU/MM3 (ref 0.4–1.3)
MONOCYTES: 12 % (ref 0–12)
PDW BLD-RTO: 13.9 % (ref 11.5–14.5)
PLATELET # BLD: 256 THOU/MM3 (ref 130–400)
PMV BLD AUTO: 8.3 FL (ref 9.4–12.4)
POTASSIUM, WHOLE BLOOD: 4.4 MEQ/L (ref 3.5–4.9)
RBC # BLD: 3.26 MILL/MM3 (ref 4.2–5.4)
SEG NEUTROPHILS: 49 % (ref 43–75)
SEGMENTED NEUTROPHILS ABSOLUTE COUNT: 1.3 THOU/MM3 (ref 1.8–7.7)
SODIUM, WHOLE BLOOD: 141 MEQ/L (ref 138–146)
TOTAL CO2, WHOLE BLOOD: 27 MEQ/L (ref 23–33)
TOTAL PROTEIN: 6.4 G/DL (ref 6.1–8)
WBC # BLD: 2.8 THOU/MM3 (ref 4.8–10.8)

## 2020-12-23 PROCEDURE — 99211 OFF/OP EST MAY X REQ PHY/QHP: CPT

## 2020-12-23 PROCEDURE — 2580000003 HC RX 258: Performed by: INTERNAL MEDICINE

## 2020-12-23 PROCEDURE — 85025 COMPLETE CBC W/AUTO DIFF WBC: CPT

## 2020-12-23 PROCEDURE — 96413 CHEMO IV INFUSION 1 HR: CPT

## 2020-12-23 PROCEDURE — 2500000003 HC RX 250 WO HCPCS: Performed by: INTERNAL MEDICINE

## 2020-12-23 PROCEDURE — 80047 BASIC METABLC PNL IONIZED CA: CPT

## 2020-12-23 PROCEDURE — 6360000002 HC RX W HCPCS: Performed by: INTERNAL MEDICINE

## 2020-12-23 PROCEDURE — 80076 HEPATIC FUNCTION PANEL: CPT

## 2020-12-23 PROCEDURE — 36591 DRAW BLOOD OFF VENOUS DEVICE: CPT

## 2020-12-23 PROCEDURE — 96375 TX/PRO/DX INJ NEW DRUG ADDON: CPT

## 2020-12-23 RX ORDER — DEXAMETHASONE SODIUM PHOSPHATE 4 MG/ML
10 INJECTION, SOLUTION INTRA-ARTICULAR; INTRALESIONAL; INTRAMUSCULAR; INTRAVENOUS; SOFT TISSUE ONCE
Status: COMPLETED | OUTPATIENT
Start: 2020-12-23 | End: 2020-12-23

## 2020-12-23 RX ORDER — HEPARIN SODIUM (PORCINE) LOCK FLUSH IV SOLN 100 UNIT/ML 100 UNIT/ML
500 SOLUTION INTRAVENOUS PRN
Status: DISCONTINUED | OUTPATIENT
Start: 2020-12-23 | End: 2020-12-24 | Stop reason: HOSPADM

## 2020-12-23 RX ORDER — SODIUM CHLORIDE 9 MG/ML
20 INJECTION, SOLUTION INTRAVENOUS ONCE
Status: COMPLETED | OUTPATIENT
Start: 2020-12-23 | End: 2020-12-23

## 2020-12-23 RX ORDER — DIPHENHYDRAMINE HYDROCHLORIDE 50 MG/ML
50 INJECTION INTRAMUSCULAR; INTRAVENOUS ONCE
Status: COMPLETED | OUTPATIENT
Start: 2020-12-23 | End: 2020-12-23

## 2020-12-23 RX ORDER — SODIUM CHLORIDE 0.9 % (FLUSH) 0.9 %
10 SYRINGE (ML) INJECTION PRN
Status: DISCONTINUED | OUTPATIENT
Start: 2020-12-23 | End: 2020-12-24 | Stop reason: HOSPADM

## 2020-12-23 RX ADMIN — Medication 10 ML: at 11:29

## 2020-12-23 RX ADMIN — Medication 500 UNITS: at 11:29

## 2020-12-23 RX ADMIN — Medication 10 ML: at 08:46

## 2020-12-23 RX ADMIN — FAMOTIDINE 20 MG: 10 INJECTION INTRAVENOUS at 09:43

## 2020-12-23 RX ADMIN — DEXAMETHASONE SODIUM PHOSPHATE 10 MG: 4 INJECTION, SOLUTION INTRAMUSCULAR; INTRAVENOUS at 09:52

## 2020-12-23 RX ADMIN — Medication 10 ML: at 09:43

## 2020-12-23 RX ADMIN — Medication 10 ML: at 08:45

## 2020-12-23 RX ADMIN — SODIUM CHLORIDE 20 ML/HR: 9 INJECTION, SOLUTION INTRAVENOUS at 09:40

## 2020-12-23 RX ADMIN — Medication 10 ML: at 09:40

## 2020-12-23 RX ADMIN — PACLITAXEL 126 MG: 6 INJECTION, SOLUTION, CONCENTRATE INTRAVENOUS at 10:01

## 2020-12-23 RX ADMIN — DIPHENHYDRAMINE HYDROCHLORIDE 25 MG: 50 INJECTION INTRAMUSCULAR; INTRAVENOUS at 09:47

## 2020-12-23 NOTE — PLAN OF CARE

## 2020-12-23 NOTE — PROGRESS NOTES
Chemotherapy Administration    Pre-assessment Data: Antineoplastic Agents  Other:   See toxicity flow sheet for assessment [x]     Physician Notification of Concerns Related to Chemotherapy Administration:   Physician Notified Abena Angela / Time of Notification Patient saw Dr Dean Marte today     Interventions:   Lab work assessed  [x]   Height / Weight verified for dose [x]   Current MAR reviewed [x]   Emergency drugs available as appropriate [x]   Anaphylaxis assessment completed [x]   Pre-medications administered as ordered [x]   Blood return noted upon initiation of chemotherapy [x]   Blood return noted each 1-2ml of a vesicant medication if given IV push []   Blood return noted each 2-3ml of a non-vesicant medication if given IV push []   Monitor for signs / symptoms of hypersensitivity reaction [x]   Chemotherapy orders (drug/dose/rate) verified by 2 Chemo certified RNs [x]   Monitor IV site and blood return throughout the infusion of the medication [x]   Document IV site checks on the IV assessment form [x]   Document chemotherapy teaching on the Patient Education tab [x]   Document patient verbalizes understanding of medications being administered [x]   If IV infiltration, see ONS Guidelines []   Other:      []         Taxol Administration:  Taxol is filtered, use specific tubing for administration. If hypersensitivity reaction occurs, STOP TAXOL, maintain plain IV and notify physician for additional orders. Taxol is considered an irritant in low doses. High dose Taxol is considered a vesicant. Check with physician if infusion should be through a central line.    Neurological assessment completed pre administration [x]   Pre-medications administered as ordered [x]   Document baseline vital signs before administration [x]   For 3 hour Taxol: Document blood pressure, pulse, respiratory rate every 15 min times 1 hour after the start of Taxol []   For 1 hour Taxol: Document blood pressure, pulse, respiratory rate 15 min after the start of Taxol [x]   Document blood pressure, pulse, respiratory rate at the completion of Taxol [x]   Other:     []

## 2020-12-23 NOTE — PROGRESS NOTES
Patient assessed for the following post chemotherapy:    Dizziness   No  Lightheadedness  No      Acute nausea/vomiting No  Headache   No  Chest pain/pressure  No  Rash/itching   No  Shortness of breath  No    Patient kept for 20 minutes observation post infusion chemotherapy. Patient tolerated chemotherapy treatment taxol without any complications. Last vital signs:   BP 96/60   Pulse 82   Temp 98.3 °F (36.8 °C) (Oral)   Resp 18   SpO2 98%     Patient instructed if experience any of the above symptoms following today's infusion,she is to notify MD immediately or go to the emergency department. Discharge instructions given to patient. Verbalizes understanding. Ambulated off unit per self with belongings.

## 2020-12-23 NOTE — PATIENT INSTRUCTIONS
1.  Chemotherapy treatment today. 2.  Continue weekly chemotherapy treatment. 3.  Labs with each weekly chemotherapy treatment. 4.  Chemotherapy on return to clinic. 5.  Labs on return to clinic.

## 2020-12-30 ENCOUNTER — HOSPITAL ENCOUNTER (OUTPATIENT)
Dept: INFUSION THERAPY | Age: 55
Discharge: HOME | End: 2020-12-30
Payer: COMMERCIAL

## 2020-12-30 VITALS
SYSTOLIC BLOOD PRESSURE: 102 MMHG | BODY MASS INDEX: 18.16 KG/M2 | TEMPERATURE: 99 F | RESPIRATION RATE: 18 BRPM | HEART RATE: 82 BPM | WEIGHT: 119.8 LBS | OXYGEN SATURATION: 97 % | DIASTOLIC BLOOD PRESSURE: 55 MMHG | HEIGHT: 68 IN

## 2020-12-30 DIAGNOSIS — C50.411 MALIGNANT NEOPLASM OF UPPER-OUTER QUADRANT OF RIGHT FEMALE BREAST, UNSPECIFIED ESTROGEN RECEPTOR STATUS (HCC): Primary | ICD-10-CM

## 2020-12-30 DIAGNOSIS — Z51.11 ENCOUNTER FOR CHEMOTHERAPY MANAGEMENT: ICD-10-CM

## 2020-12-30 LAB
ABSOLUTE IMMATURE GRANULOCYTE: 0.01 THOU/MM3 (ref 0–0.07)
BASINOPHIL, AUTOMATED: 1 % (ref 0–3)
BASOPHILS ABSOLUTE: 0 THOU/MM3 (ref 0–0.1)
BUN, WHOLE BLOOD: 11 MG/DL (ref 8–26)
CHLORIDE, WHOLE BLOOD: 104 MEQ/L (ref 98–109)
CREATININE, WHOLE BLOOD: 0.7 MG/DL (ref 0.5–1.2)
EOSINOPHILS ABSOLUTE: 0 THOU/MM3 (ref 0–0.4)
EOSINOPHILS RELATIVE PERCENT: 1 % (ref 0–4)
GFR, ESTIMATED ,CON: > 90 ML/MIN/1.73M2
GLUCOSE, WHOLE BLOOD: 91 MG/DL (ref 70–108)
HCT VFR BLD CALC: 34.3 % (ref 37–47)
HEMOGLOBIN: 11.3 GM/DL (ref 12–16)
IMMATURE GRANULOCYTES: 0 %
IONIZED CALCIUM, WHOLE BLOOD: 1.16 MMOL/L (ref 1.12–1.32)
LYMPHOCYTES # BLD: 36 % (ref 15–47)
LYMPHOCYTES ABSOLUTE: 0.9 THOU/MM3 (ref 1–4.8)
MCH RBC QN AUTO: 33 PG (ref 26–33)
MCHC RBC AUTO-ENTMCNC: 32.9 GM/DL (ref 32.2–35.5)
MCV RBC AUTO: 100 FL (ref 81–99)
MONOCYTES ABSOLUTE: 0.3 THOU/MM3 (ref 0.4–1.3)
MONOCYTES: 14 % (ref 0–12)
PDW BLD-RTO: 13.3 % (ref 11.5–14.5)
PLATELET # BLD: 246 THOU/MM3 (ref 130–400)
PMV BLD AUTO: 8.4 FL (ref 9.4–12.4)
POTASSIUM, WHOLE BLOOD: 4.2 MEQ/L (ref 3.5–4.9)
RBC # BLD: 3.42 MILL/MM3 (ref 4.2–5.4)
SEG NEUTROPHILS: 48 % (ref 43–75)
SEGMENTED NEUTROPHILS ABSOLUTE COUNT: 1.2 THOU/MM3 (ref 1.8–7.7)
SODIUM, WHOLE BLOOD: 140 MEQ/L (ref 138–146)
TOTAL CO2, WHOLE BLOOD: 27 MEQ/L (ref 23–33)
WBC # BLD: 2.4 THOU/MM3 (ref 4.8–10.8)

## 2020-12-30 PROCEDURE — 85025 COMPLETE CBC W/AUTO DIFF WBC: CPT

## 2020-12-30 PROCEDURE — 80047 BASIC METABLC PNL IONIZED CA: CPT

## 2020-12-30 PROCEDURE — 6360000002 HC RX W HCPCS: Performed by: INTERNAL MEDICINE

## 2020-12-30 PROCEDURE — 36591 DRAW BLOOD OFF VENOUS DEVICE: CPT

## 2020-12-30 PROCEDURE — 2500000003 HC RX 250 WO HCPCS: Performed by: INTERNAL MEDICINE

## 2020-12-30 PROCEDURE — 96413 CHEMO IV INFUSION 1 HR: CPT

## 2020-12-30 PROCEDURE — 2580000003 HC RX 258: Performed by: INTERNAL MEDICINE

## 2020-12-30 PROCEDURE — 96375 TX/PRO/DX INJ NEW DRUG ADDON: CPT

## 2020-12-30 RX ORDER — DIPHENHYDRAMINE HYDROCHLORIDE 50 MG/ML
50 INJECTION INTRAMUSCULAR; INTRAVENOUS ONCE
Status: COMPLETED | OUTPATIENT
Start: 2020-12-30 | End: 2020-12-30

## 2020-12-30 RX ORDER — HEPARIN SODIUM (PORCINE) LOCK FLUSH IV SOLN 100 UNIT/ML 100 UNIT/ML
500 SOLUTION INTRAVENOUS PRN
Status: DISCONTINUED | OUTPATIENT
Start: 2020-12-30 | End: 2020-12-31 | Stop reason: HOSPADM

## 2020-12-30 RX ORDER — SODIUM CHLORIDE 9 MG/ML
20 INJECTION, SOLUTION INTRAVENOUS ONCE
Status: COMPLETED | OUTPATIENT
Start: 2020-12-30 | End: 2020-12-30

## 2020-12-30 RX ORDER — DEXAMETHASONE SODIUM PHOSPHATE 4 MG/ML
10 INJECTION, SOLUTION INTRA-ARTICULAR; INTRALESIONAL; INTRAMUSCULAR; INTRAVENOUS; SOFT TISSUE ONCE
Status: COMPLETED | OUTPATIENT
Start: 2020-12-30 | End: 2020-12-30

## 2020-12-30 RX ORDER — SODIUM CHLORIDE 0.9 % (FLUSH) 0.9 %
10 SYRINGE (ML) INJECTION PRN
Status: DISCONTINUED | OUTPATIENT
Start: 2020-12-30 | End: 2020-12-31 | Stop reason: HOSPADM

## 2020-12-30 RX ADMIN — FAMOTIDINE 20 MG: 10 INJECTION INTRAVENOUS at 09:16

## 2020-12-30 RX ADMIN — Medication 10 ML: at 08:36

## 2020-12-30 RX ADMIN — DEXAMETHASONE SODIUM PHOSPHATE 10 MG: 4 INJECTION INTRA-ARTICULAR; INTRALESIONAL; INTRAMUSCULAR; INTRAVENOUS; SOFT TISSUE at 09:22

## 2020-12-30 RX ADMIN — PACLITAXEL 126 MG: 6 INJECTION, SOLUTION, CONCENTRATE INTRAVENOUS at 09:28

## 2020-12-30 RX ADMIN — DIPHENHYDRAMINE HYDROCHLORIDE 25 MG: 50 INJECTION INTRAMUSCULAR; INTRAVENOUS at 09:19

## 2020-12-30 RX ADMIN — Medication 10 ML: at 09:11

## 2020-12-30 RX ADMIN — Medication 10 ML: at 08:35

## 2020-12-30 RX ADMIN — Medication 10 ML: at 10:46

## 2020-12-30 RX ADMIN — SODIUM CHLORIDE 20 ML/HR: 9 INJECTION, SOLUTION INTRAVENOUS at 09:11

## 2020-12-30 RX ADMIN — Medication 500 UNITS: at 10:46

## 2020-12-30 NOTE — ONCOLOGY
Chemotherapy Administration    Pre-assessment Data: Antineoplastic Agents  Other:   See toxicity flow sheet for assessment [x]     Physician Notification of Concerns Related to Chemotherapy Administration:   Physician Notified Magdaleno Hernández / Time of Notification      Interventions:   Lab work assessed  [x]   Height / Weight verified for dose [x]   Current MAR reviewed [x]   Emergency drugs available as appropriate [x]   Anaphylaxis assessment completed [x]   Pre-medications administered as ordered [x]   Blood return noted upon initiation of chemotherapy [x]   Blood return noted each 1-2ml of a vesicant medication if given IV push []   Blood return noted each 2-3ml of a non-vesicant medication if given IV push []   Monitor for signs / symptoms of hypersensitivity reaction [x]   Chemotherapy orders (drug/dose/rate) verified by 2 Chemo certified RNs [x]   Monitor IV site and blood return throughout the infusion of the medication [x]   Document IV site checks on the IV assessment form [x]   Document chemotherapy teaching on the Patient Education tab [x]   Document patient verbalizes understanding of medications being administered [x]   If IV infiltration, see ONS Guidelines []   Other:      []

## 2020-12-30 NOTE — PLAN OF CARE
Problem: Infection - Central Venous Catheter-Associated Bloodstream Infection:  Goal: Will show no infection signs and symptoms  Description: Will show no infection signs and symptoms  Outcome: Met This Shift  Note: Mediport site with no redness or warmth. Skin over port site intact with no signs of breakdown noted. Patient verbalizes signs/symptoms of port infection and when to notify the physician. Intervention: Infection risk assessment  Note: Discuss port maintenance, infection prevention, signs of infection, and when to call the doctor. Problem: Musculor/Skeletal Functional Status  Goal: Absence of falls  Outcome: Met This Shift  Note: Patient free of falls this visit. Intervention: Fall precautions  Note: Fall risks assessed. Precautions discussed. Call light within reach during visit. Problem: Intellectual/Education/Knowledge Deficit  Goal: Teaching initiated upon admission  Outcome: Met This Shift  Note: Patient verbalizes understanding to verbal information given on taxol, including action and possible side effects. Aware to call MD if develop complications.     Intervention: Verbal/written education provided  Note: Chemotherapy Teaching     What is Chemotherapy   Drug action [x]   Method of Administration [x]   Handouts given []     Side Effects  Nausea/vomiting [x]   Diarrhea [x]   Fatigue [x]   Signs / Symptoms of infection [x]   Neutropenia [x]   Thrombocytopenia [x]   Alopecia [x]   neuropathy [x]   [place] diet &  the importance of fluids [x]       Micellaneous  Importance of nutrition [x]   Importance of oral hygiene [x]   When to call the MD [x]   Monitoring labs [x]   Use of supportive services []     Explanation of Drug Regimen / Frequency  taxol     Comments  Verbalized understanding to drug,action,side effects and when to call MD         Problem: Discharge Planning  Goal: Knowledge of discharge instructions  Description: Knowledge of discharge instructions  Outcome: Met This Shift  Note: Patient verbalizes understanding of discharge instructions, follow up appointment, and when to call physician if needed   Intervention: Interaction with patient/family and care team  Note: Discharge instructions given to pt and reviewed. Follow up appointments discussed. Care plan reviewed with patient. Patient verbalizes understanding of the plan of care and contributes to goal setting.

## 2020-12-30 NOTE — PROGRESS NOTES
Patient assessed for the following post chemotherapy:    Dizziness   No  Lightheadedness  No      Acute nausea/vomiting No  Headache   No  Chest pain/pressure  No  Rash/itching   No  Shortness of breath  No    Patient kept for 20 minutes observation post infusion chemotherapy. Patient tolerated chemotherapy treatment taxol without any complications. Last vital signs:   BP (!) 102/55   Pulse 82   Temp 99 °F (37.2 °C) (Oral)   Resp 18   Ht 5' 8\" (1.727 m)   Wt 119 lb 12.8 oz (54.3 kg)   SpO2 97%   BMI 18.22 kg/m²       Patient instructed if experience any of the above symptoms following today's infusion, she is to notify MD immediately or go to the emergency department. Discharge instructions given to patient. Verbalizes understanding. Ambulated off unit per self with belongings.

## 2021-01-05 RX ORDER — DOXORUBICIN HYDROCHLORIDE 2 MG/ML
60 INJECTION, SOLUTION INTRAVENOUS ONCE
Status: CANCELLED | OUTPATIENT
Start: 2021-01-05

## 2021-01-05 RX ORDER — SODIUM CHLORIDE 0.9 % (FLUSH) 0.9 %
10 SYRINGE (ML) INJECTION PRN
Status: CANCELLED | OUTPATIENT
Start: 2021-01-05

## 2021-01-05 RX ORDER — DIPHENHYDRAMINE HYDROCHLORIDE 50 MG/ML
50 INJECTION INTRAMUSCULAR; INTRAVENOUS ONCE
Status: CANCELLED | OUTPATIENT
Start: 2021-01-06 | End: 2021-01-06

## 2021-01-05 RX ORDER — METHYLPREDNISOLONE SODIUM SUCCINATE 125 MG/2ML
125 INJECTION, POWDER, LYOPHILIZED, FOR SOLUTION INTRAMUSCULAR; INTRAVENOUS ONCE
Status: CANCELLED | OUTPATIENT
Start: 2021-01-05 | End: 2021-01-05

## 2021-01-05 RX ORDER — MEPERIDINE HYDROCHLORIDE 25 MG/ML
12.5 INJECTION INTRAMUSCULAR; INTRAVENOUS; SUBCUTANEOUS ONCE
Status: CANCELLED | OUTPATIENT
Start: 2021-01-06 | End: 2021-01-06

## 2021-01-05 RX ORDER — DIPHENHYDRAMINE HYDROCHLORIDE 50 MG/ML
50 INJECTION INTRAMUSCULAR; INTRAVENOUS ONCE
Status: CANCELLED | OUTPATIENT
Start: 2021-01-05 | End: 2021-01-05

## 2021-01-05 RX ORDER — SODIUM CHLORIDE 9 MG/ML
20 INJECTION, SOLUTION INTRAVENOUS ONCE
Status: CANCELLED | OUTPATIENT
Start: 2021-01-05 | End: 2021-01-05

## 2021-01-05 RX ORDER — SODIUM CHLORIDE 0.9 % (FLUSH) 0.9 %
5 SYRINGE (ML) INJECTION PRN
Status: CANCELLED | OUTPATIENT
Start: 2021-01-06

## 2021-01-05 RX ORDER — PALONOSETRON 0.05 MG/ML
0.25 INJECTION, SOLUTION INTRAVENOUS ONCE
Status: CANCELLED | OUTPATIENT
Start: 2021-01-05

## 2021-01-05 RX ORDER — METHYLPREDNISOLONE SODIUM SUCCINATE 125 MG/2ML
125 INJECTION, POWDER, LYOPHILIZED, FOR SOLUTION INTRAMUSCULAR; INTRAVENOUS ONCE
Status: CANCELLED | OUTPATIENT
Start: 2021-01-06 | End: 2021-01-06

## 2021-01-05 RX ORDER — SODIUM CHLORIDE 0.9 % (FLUSH) 0.9 %
5 SYRINGE (ML) INJECTION PRN
Status: CANCELLED | OUTPATIENT
Start: 2021-01-05

## 2021-01-05 RX ORDER — SODIUM CHLORIDE 9 MG/ML
INJECTION, SOLUTION INTRAVENOUS CONTINUOUS
Status: CANCELLED | OUTPATIENT
Start: 2021-01-06

## 2021-01-05 RX ORDER — HEPARIN SODIUM (PORCINE) LOCK FLUSH IV SOLN 100 UNIT/ML 100 UNIT/ML
500 SOLUTION INTRAVENOUS PRN
Status: CANCELLED | OUTPATIENT
Start: 2021-01-05

## 2021-01-05 RX ORDER — SODIUM CHLORIDE 9 MG/ML
INJECTION, SOLUTION INTRAVENOUS CONTINUOUS
Status: CANCELLED | OUTPATIENT
Start: 2021-01-05

## 2021-01-06 ENCOUNTER — HOSPITAL ENCOUNTER (OUTPATIENT)
Dept: INFUSION THERAPY | Age: 56
Discharge: HOME | End: 2021-01-06
Payer: COMMERCIAL

## 2021-01-06 VITALS
BODY MASS INDEX: 18.07 KG/M2 | OXYGEN SATURATION: 97 % | SYSTOLIC BLOOD PRESSURE: 103 MMHG | RESPIRATION RATE: 18 BRPM | WEIGHT: 119.2 LBS | HEART RATE: 79 BPM | TEMPERATURE: 98.5 F | DIASTOLIC BLOOD PRESSURE: 55 MMHG | HEIGHT: 68 IN

## 2021-01-06 DIAGNOSIS — C50.411 MALIGNANT NEOPLASM OF UPPER-OUTER QUADRANT OF RIGHT FEMALE BREAST, UNSPECIFIED ESTROGEN RECEPTOR STATUS (HCC): Primary | ICD-10-CM

## 2021-01-06 DIAGNOSIS — Z51.11 ENCOUNTER FOR CHEMOTHERAPY MANAGEMENT: ICD-10-CM

## 2021-01-06 LAB
ABSOLUTE IMMATURE GRANULOCYTE: 0.01 THOU/MM3 (ref 0–0.07)
BASINOPHIL, AUTOMATED: 1 % (ref 0–3)
BASOPHILS ABSOLUTE: 0 THOU/MM3 (ref 0–0.1)
BUN, WHOLE BLOOD: 11 MG/DL (ref 8–26)
CHLORIDE, WHOLE BLOOD: 106 MEQ/L (ref 98–109)
CREATININE, WHOLE BLOOD: 0.8 MG/DL (ref 0.5–1.2)
EOSINOPHILS ABSOLUTE: 0 THOU/MM3 (ref 0–0.4)
EOSINOPHILS RELATIVE PERCENT: 1 % (ref 0–4)
GFR, ESTIMATED ,CON: 79 ML/MIN/1.73M2
GLUCOSE, WHOLE BLOOD: 103 MG/DL (ref 70–108)
HCT VFR BLD CALC: 34.5 % (ref 37–47)
HEMOGLOBIN: 11.5 GM/DL (ref 12–16)
IMMATURE GRANULOCYTES: 0 %
IONIZED CALCIUM, WHOLE BLOOD: 1.19 MMOL/L (ref 1.12–1.32)
LYMPHOCYTES # BLD: 36 % (ref 15–47)
LYMPHOCYTES ABSOLUTE: 1 THOU/MM3 (ref 1–4.8)
MCH RBC QN AUTO: 33.4 PG (ref 26–33)
MCHC RBC AUTO-ENTMCNC: 33.3 GM/DL (ref 32.2–35.5)
MCV RBC AUTO: 100 FL (ref 81–99)
MONOCYTES ABSOLUTE: 0.3 THOU/MM3 (ref 0.4–1.3)
MONOCYTES: 12 % (ref 0–12)
PDW BLD-RTO: 12.9 % (ref 11.5–14.5)
PLATELET # BLD: 246 THOU/MM3 (ref 130–400)
PMV BLD AUTO: 8.3 FL (ref 9.4–12.4)
POTASSIUM, WHOLE BLOOD: 4.1 MEQ/L (ref 3.5–4.9)
RBC # BLD: 3.44 MILL/MM3 (ref 4.2–5.4)
SEG NEUTROPHILS: 50 % (ref 43–75)
SEGMENTED NEUTROPHILS ABSOLUTE COUNT: 1.4 THOU/MM3 (ref 1.8–7.7)
SODIUM, WHOLE BLOOD: 141 MEQ/L (ref 138–146)
TOTAL CO2, WHOLE BLOOD: 28 MEQ/L (ref 23–33)
WBC # BLD: 2.8 THOU/MM3 (ref 4.8–10.8)

## 2021-01-06 PROCEDURE — 6360000002 HC RX W HCPCS: Performed by: INTERNAL MEDICINE

## 2021-01-06 PROCEDURE — 96413 CHEMO IV INFUSION 1 HR: CPT

## 2021-01-06 PROCEDURE — 36591 DRAW BLOOD OFF VENOUS DEVICE: CPT

## 2021-01-06 PROCEDURE — 85025 COMPLETE CBC W/AUTO DIFF WBC: CPT

## 2021-01-06 PROCEDURE — 2500000003 HC RX 250 WO HCPCS: Performed by: INTERNAL MEDICINE

## 2021-01-06 PROCEDURE — 99211 OFF/OP EST MAY X REQ PHY/QHP: CPT

## 2021-01-06 PROCEDURE — 80047 BASIC METABLC PNL IONIZED CA: CPT

## 2021-01-06 PROCEDURE — 96375 TX/PRO/DX INJ NEW DRUG ADDON: CPT

## 2021-01-06 PROCEDURE — 2580000003 HC RX 258: Performed by: INTERNAL MEDICINE

## 2021-01-06 RX ORDER — SODIUM CHLORIDE 9 MG/ML
INJECTION, SOLUTION INTRAVENOUS CONTINUOUS
Status: CANCELLED | OUTPATIENT
Start: 2021-01-13

## 2021-01-06 RX ORDER — DEXAMETHASONE SODIUM PHOSPHATE 4 MG/ML
10 INJECTION, SOLUTION INTRA-ARTICULAR; INTRALESIONAL; INTRAMUSCULAR; INTRAVENOUS; SOFT TISSUE ONCE
Status: COMPLETED | OUTPATIENT
Start: 2021-01-06 | End: 2021-01-06

## 2021-01-06 RX ORDER — HEPARIN SODIUM (PORCINE) LOCK FLUSH IV SOLN 100 UNIT/ML 100 UNIT/ML
500 SOLUTION INTRAVENOUS PRN
Status: DISCONTINUED | OUTPATIENT
Start: 2021-01-06 | End: 2021-01-07 | Stop reason: HOSPADM

## 2021-01-06 RX ORDER — DIPHENHYDRAMINE HYDROCHLORIDE 50 MG/ML
25 INJECTION INTRAMUSCULAR; INTRAVENOUS ONCE
Status: COMPLETED | OUTPATIENT
Start: 2021-01-06 | End: 2021-01-06

## 2021-01-06 RX ORDER — ONDANSETRON 2 MG/ML
8 INJECTION INTRAMUSCULAR; INTRAVENOUS ONCE
Status: CANCELLED | OUTPATIENT
Start: 2021-01-13

## 2021-01-06 RX ORDER — ONDANSETRON 2 MG/ML
8 INJECTION INTRAMUSCULAR; INTRAVENOUS ONCE
Status: COMPLETED | OUTPATIENT
Start: 2021-01-06 | End: 2021-01-06

## 2021-01-06 RX ORDER — DIPHENHYDRAMINE HYDROCHLORIDE 50 MG/ML
50 INJECTION INTRAMUSCULAR; INTRAVENOUS ONCE
Status: CANCELLED | OUTPATIENT
Start: 2021-01-13 | End: 2021-01-13

## 2021-01-06 RX ORDER — MEPERIDINE HYDROCHLORIDE 50 MG/ML
12.5 INJECTION INTRAMUSCULAR; INTRAVENOUS; SUBCUTANEOUS ONCE
Status: CANCELLED | OUTPATIENT
Start: 2021-01-13 | End: 2021-01-13

## 2021-01-06 RX ORDER — SODIUM CHLORIDE 0.9 % (FLUSH) 0.9 %
10 SYRINGE (ML) INJECTION PRN
Status: CANCELLED | OUTPATIENT
Start: 2021-01-13

## 2021-01-06 RX ORDER — EPINEPHRINE 1 MG/ML
0.3 INJECTION, SOLUTION, CONCENTRATE INTRAVENOUS PRN
Status: CANCELLED | OUTPATIENT
Start: 2021-01-13

## 2021-01-06 RX ORDER — DIPHENHYDRAMINE HYDROCHLORIDE 50 MG/ML
25 INJECTION INTRAMUSCULAR; INTRAVENOUS ONCE
Status: CANCELLED | OUTPATIENT
Start: 2021-01-13

## 2021-01-06 RX ORDER — SODIUM CHLORIDE 0.9 % (FLUSH) 0.9 %
5 SYRINGE (ML) INJECTION PRN
Status: CANCELLED | OUTPATIENT
Start: 2021-01-13

## 2021-01-06 RX ORDER — HEPARIN SODIUM (PORCINE) LOCK FLUSH IV SOLN 100 UNIT/ML 100 UNIT/ML
500 SOLUTION INTRAVENOUS PRN
Status: CANCELLED | OUTPATIENT
Start: 2021-01-13

## 2021-01-06 RX ORDER — SODIUM CHLORIDE 0.9 % (FLUSH) 0.9 %
10 SYRINGE (ML) INJECTION PRN
Status: DISCONTINUED | OUTPATIENT
Start: 2021-01-06 | End: 2021-01-07 | Stop reason: HOSPADM

## 2021-01-06 RX ORDER — SODIUM CHLORIDE 9 MG/ML
20 INJECTION, SOLUTION INTRAVENOUS ONCE
Status: CANCELLED | OUTPATIENT
Start: 2021-01-13 | End: 2021-01-13

## 2021-01-06 RX ORDER — METHYLPREDNISOLONE SODIUM SUCCINATE 125 MG/2ML
125 INJECTION, POWDER, LYOPHILIZED, FOR SOLUTION INTRAMUSCULAR; INTRAVENOUS ONCE
Status: CANCELLED | OUTPATIENT
Start: 2021-01-13 | End: 2021-01-13

## 2021-01-06 RX ORDER — SODIUM CHLORIDE 9 MG/ML
20 INJECTION, SOLUTION INTRAVENOUS ONCE
Status: COMPLETED | OUTPATIENT
Start: 2021-01-06 | End: 2021-01-06

## 2021-01-06 RX ADMIN — PACLITAXEL 126 MG: 6 INJECTION, SOLUTION, CONCENTRATE INTRAVENOUS at 10:05

## 2021-01-06 RX ADMIN — FAMOTIDINE 20 MG: 10 INJECTION INTRAVENOUS at 09:50

## 2021-01-06 RX ADMIN — Medication 10 ML: at 09:45

## 2021-01-06 RX ADMIN — DIPHENHYDRAMINE HYDROCHLORIDE 25 MG: 50 INJECTION, SOLUTION INTRAMUSCULAR; INTRAVENOUS at 09:54

## 2021-01-06 RX ADMIN — Medication 10 ML: at 08:50

## 2021-01-06 RX ADMIN — Medication 500 UNITS: at 11:23

## 2021-01-06 RX ADMIN — Medication 10 ML: at 11:23

## 2021-01-06 RX ADMIN — SODIUM CHLORIDE 20 ML/HR: 9 INJECTION, SOLUTION INTRAVENOUS at 09:45

## 2021-01-06 RX ADMIN — ONDANSETRON 8 MG: 2 INJECTION INTRAMUSCULAR; INTRAVENOUS at 09:47

## 2021-01-06 RX ADMIN — Medication 10 ML: at 08:51

## 2021-01-06 RX ADMIN — DEXAMETHASONE SODIUM PHOSPHATE 10 MG: 4 INJECTION, SOLUTION INTRAMUSCULAR; INTRAVENOUS at 09:57

## 2021-01-06 RX ADMIN — Medication 10 ML: at 09:50

## 2021-01-06 NOTE — PROGRESS NOTES
Chemotherapy Administration    Pre-assessment Data: Antineoplastic Agents  Other:   See toxicity flow sheet for assessment [x]     Physician Notification of Concerns Related to Chemotherapy Administration:   Physician Notified Carly Tello / Time of Notification      Interventions:   Lab work assessed  [x]   Height / Weight verified for dose [x]   Current MAR reviewed [x]   Emergency drugs available as appropriate [x]   Anaphylaxis assessment completed [x]   Pre-medications administered as ordered [x]   Blood return noted upon initiation of chemotherapy [x]   Blood return noted each 1-2ml of a vesicant medication if given IV push []   Blood return noted each 2-3ml of a non-vesicant medication if given IV push []   Monitor for signs / symptoms of hypersensitivity reaction [x]   Chemotherapy orders (drug/dose/rate) verified by 2 Chemo certified RNs [x]   Monitor IV site and blood return throughout the infusion of the medication [x]   Document IV site checks on the IV assessment form [x]   Document chemotherapy teaching on the Patient Education tab [x]   Document patient verbalizes understanding of medications being administered [x]   If IV infiltration, see ONS Guidelines []   Other:      []         Taxol Administration:  Taxol is filtered, use specific tubing for administration. If hypersensitivity reaction occurs, STOP TAXOL, maintain plain IV and notify physician for additional orders. Taxol is considered an irritant in low doses. High dose Taxol is considered a vesicant. Check with physician if infusion should be through a central line.    Neurological assessment completed pre administration [x]   Pre-medications administered as ordered [x]   Document baseline vital signs before administration [x]   For 3 hour Taxol: Document blood pressure, pulse, respiratory rate every 15 min times 1 hour after the start of Taxol []   For 1 hour Taxol: Document blood pressure, pulse, respiratory rate 15 min after the start of Taxol [x]   Document blood pressure, pulse, respiratory rate at the completion of Taxol [x]   Other:     []

## 2021-01-06 NOTE — PROGRESS NOTES
Patient assessed for the following post chemotherapy:    Dizziness   No  Lightheadedness  No      Acute nausea/vomiting No  Headache   No  Chest pain/pressure  No  Rash/itching   No  Shortness of breath  No    Patient kept for 20 minutes observation post infusion chemotherapy. Patient tolerated chemotherapy treatment taxol without any complications. Last vital signs:   BP (!) 103/55   Pulse 79   Temp 98.5 °F (36.9 °C) (Oral)   Resp 18   Ht 5' 8\" (1.727 m)   Wt 119 lb 3.2 oz (54.1 kg)   SpO2 97%   BMI 18.12 kg/m²     Patient instructed if experience any of the above symptoms following today's infusion,she is to notify MD immediately or go to the emergency department. Discharge instructions given to patient. Verbalizes understanding. Ambulated off unit per self with belongings.

## 2021-01-06 NOTE — PLAN OF CARE

## 2021-01-13 ENCOUNTER — HOSPITAL ENCOUNTER (OUTPATIENT)
Dept: INFUSION THERAPY | Age: 56
Discharge: HOME | End: 2021-01-13
Payer: COMMERCIAL

## 2021-01-13 VITALS
TEMPERATURE: 98 F | BODY MASS INDEX: 18.12 KG/M2 | WEIGHT: 119.2 LBS | HEART RATE: 77 BPM | SYSTOLIC BLOOD PRESSURE: 106 MMHG | RESPIRATION RATE: 18 BRPM | DIASTOLIC BLOOD PRESSURE: 64 MMHG | OXYGEN SATURATION: 99 %

## 2021-01-13 DIAGNOSIS — C50.411 MALIGNANT NEOPLASM OF UPPER-OUTER QUADRANT OF RIGHT FEMALE BREAST, UNSPECIFIED ESTROGEN RECEPTOR STATUS (HCC): Primary | ICD-10-CM

## 2021-01-13 DIAGNOSIS — Z51.11 ENCOUNTER FOR CHEMOTHERAPY MANAGEMENT: ICD-10-CM

## 2021-01-13 LAB
ABSOLUTE IMMATURE GRANULOCYTE: 0.01 THOU/MM3 (ref 0–0.07)
BASINOPHIL, AUTOMATED: 1 % (ref 0–3)
BASOPHILS ABSOLUTE: 0 THOU/MM3 (ref 0–0.1)
BUN, WHOLE BLOOD: 13 MG/DL (ref 8–26)
CHLORIDE, WHOLE BLOOD: 104 MEQ/L (ref 98–109)
CREATININE, WHOLE BLOOD: 0.8 MG/DL (ref 0.5–1.2)
EOSINOPHILS ABSOLUTE: 0 THOU/MM3 (ref 0–0.4)
EOSINOPHILS RELATIVE PERCENT: 0 % (ref 0–4)
GFR, ESTIMATED ,CON: 79 ML/MIN/1.73M2
GLUCOSE, WHOLE BLOOD: 90 MG/DL (ref 70–108)
HCT VFR BLD CALC: 34.5 % (ref 37–47)
HEMOGLOBIN: 11.5 GM/DL (ref 12–16)
IMMATURE GRANULOCYTES: 0 %
IONIZED CALCIUM, WHOLE BLOOD: 1.16 MMOL/L (ref 1.12–1.32)
LYMPHOCYTES # BLD: 32 % (ref 15–47)
LYMPHOCYTES ABSOLUTE: 0.9 THOU/MM3 (ref 1–4.8)
MCH RBC QN AUTO: 33 PG (ref 26–33)
MCHC RBC AUTO-ENTMCNC: 33.3 GM/DL (ref 32.2–35.5)
MCV RBC AUTO: 99 FL (ref 81–99)
MONOCYTES ABSOLUTE: 0.4 THOU/MM3 (ref 0.4–1.3)
MONOCYTES: 13 % (ref 0–12)
PDW BLD-RTO: 12.6 % (ref 11.5–14.5)
PLATELET # BLD: 273 THOU/MM3 (ref 130–400)
PMV BLD AUTO: 8.7 FL (ref 9.4–12.4)
POTASSIUM, WHOLE BLOOD: 4.4 MEQ/L (ref 3.5–4.9)
RBC # BLD: 3.49 MILL/MM3 (ref 4.2–5.4)
SEG NEUTROPHILS: 53 % (ref 43–75)
SEGMENTED NEUTROPHILS ABSOLUTE COUNT: 1.4 THOU/MM3 (ref 1.8–7.7)
SODIUM, WHOLE BLOOD: 139 MEQ/L (ref 138–146)
TOTAL CO2, WHOLE BLOOD: 26 MEQ/L (ref 23–33)
WBC # BLD: 2.7 THOU/MM3 (ref 4.8–10.8)

## 2021-01-13 PROCEDURE — 2500000003 HC RX 250 WO HCPCS: Performed by: INTERNAL MEDICINE

## 2021-01-13 PROCEDURE — 6360000002 HC RX W HCPCS: Performed by: INTERNAL MEDICINE

## 2021-01-13 PROCEDURE — 2580000003 HC RX 258: Performed by: INTERNAL MEDICINE

## 2021-01-13 PROCEDURE — 36591 DRAW BLOOD OFF VENOUS DEVICE: CPT

## 2021-01-13 PROCEDURE — 85025 COMPLETE CBC W/AUTO DIFF WBC: CPT

## 2021-01-13 PROCEDURE — 80047 BASIC METABLC PNL IONIZED CA: CPT

## 2021-01-13 PROCEDURE — 96413 CHEMO IV INFUSION 1 HR: CPT

## 2021-01-13 PROCEDURE — 96375 TX/PRO/DX INJ NEW DRUG ADDON: CPT

## 2021-01-13 RX ORDER — HEPARIN SODIUM (PORCINE) LOCK FLUSH IV SOLN 100 UNIT/ML 100 UNIT/ML
500 SOLUTION INTRAVENOUS PRN
Status: DISCONTINUED | OUTPATIENT
Start: 2021-01-13 | End: 2021-01-14 | Stop reason: HOSPADM

## 2021-01-13 RX ORDER — SODIUM CHLORIDE 0.9 % (FLUSH) 0.9 %
10 SYRINGE (ML) INJECTION PRN
Status: DISCONTINUED | OUTPATIENT
Start: 2021-01-13 | End: 2021-01-14 | Stop reason: HOSPADM

## 2021-01-13 RX ORDER — ONDANSETRON 2 MG/ML
8 INJECTION INTRAMUSCULAR; INTRAVENOUS ONCE
Status: COMPLETED | OUTPATIENT
Start: 2021-01-13 | End: 2021-01-13

## 2021-01-13 RX ORDER — SODIUM CHLORIDE 9 MG/ML
20 INJECTION, SOLUTION INTRAVENOUS ONCE
Status: COMPLETED | OUTPATIENT
Start: 2021-01-13 | End: 2021-01-13

## 2021-01-13 RX ORDER — DEXAMETHASONE SODIUM PHOSPHATE 4 MG/ML
10 INJECTION, SOLUTION INTRA-ARTICULAR; INTRALESIONAL; INTRAMUSCULAR; INTRAVENOUS; SOFT TISSUE ONCE
Status: COMPLETED | OUTPATIENT
Start: 2021-01-13 | End: 2021-01-13

## 2021-01-13 RX ORDER — DIPHENHYDRAMINE HYDROCHLORIDE 50 MG/ML
25 INJECTION INTRAMUSCULAR; INTRAVENOUS ONCE
Status: COMPLETED | OUTPATIENT
Start: 2021-01-13 | End: 2021-01-13

## 2021-01-13 RX ADMIN — SODIUM CHLORIDE 20 ML/HR: 9 INJECTION, SOLUTION INTRAVENOUS at 09:35

## 2021-01-13 RX ADMIN — Medication 500 UNITS: at 10:59

## 2021-01-13 RX ADMIN — SODIUM CHLORIDE, PRESERVATIVE FREE 10 ML: 5 INJECTION INTRAVENOUS at 09:06

## 2021-01-13 RX ADMIN — DEXAMETHASONE SODIUM PHOSPHATE 10 MG: 4 INJECTION, SOLUTION INTRA-ARTICULAR; INTRALESIONAL; INTRAMUSCULAR; INTRAVENOUS; SOFT TISSUE at 09:43

## 2021-01-13 RX ADMIN — SODIUM CHLORIDE, PRESERVATIVE FREE 10 ML: 5 INJECTION INTRAVENOUS at 09:50

## 2021-01-13 RX ADMIN — ONDANSETRON 8 MG: 2 INJECTION INTRAMUSCULAR; INTRAVENOUS at 09:38

## 2021-01-13 RX ADMIN — FAMOTIDINE 20 MG: 10 INJECTION INTRAVENOUS at 09:41

## 2021-01-13 RX ADMIN — PACLITAXEL 126 MG: 6 INJECTION, SOLUTION, CONCENTRATE INTRAVENOUS at 09:50

## 2021-01-13 RX ADMIN — SODIUM CHLORIDE, PRESERVATIVE FREE 10 ML: 5 INJECTION INTRAVENOUS at 09:35

## 2021-01-13 RX ADMIN — SODIUM CHLORIDE, PRESERVATIVE FREE 10 ML: 5 INJECTION INTRAVENOUS at 10:59

## 2021-01-13 RX ADMIN — SODIUM CHLORIDE, PRESERVATIVE FREE 10 ML: 5 INJECTION INTRAVENOUS at 09:41

## 2021-01-13 RX ADMIN — DIPHENHYDRAMINE HYDROCHLORIDE 25 MG: 50 INJECTION, SOLUTION INTRAMUSCULAR; INTRAVENOUS at 09:45

## 2021-01-13 NOTE — PROGRESS NOTES
Chemotherapy Administration    Pre-assessment Data: Antineoplastic Agents  Other:   See toxicity flow sheet for assessment [x]     Physician Notification of Concerns Related to Chemotherapy Administration:   Physician Notified Marina Espinoza / Time of Notification      Interventions:   Lab work assessed  [x]   Height / Weight verified for dose [x]   Current MAR reviewed [x]   Emergency drugs available as appropriate [x]   Anaphylaxis assessment completed [x]   Pre-medications administered as ordered [x]   Blood return noted upon initiation of chemotherapy [x]   Blood return noted each 1-2ml of a vesicant medication if given IV push []   Blood return noted each 2-3ml of a non-vesicant medication if given IV push []   Monitor for signs / symptoms of hypersensitivity reaction [x]   Chemotherapy orders (drug/dose/rate) verified by 2 Chemo certified RNs [x]   Monitor IV site and blood return throughout the infusion of the medication [x]   Document IV site checks on the IV assessment form [x]   Document chemotherapy teaching on the Patient Education tab [x]   Document patient verbalizes understanding of medications being administered [x]   If IV infiltration, see ONS Guidelines []   Other:      []         Taxol Administration:  Taxol is filtered, use specific tubing for administration. If hypersensitivity reaction occurs, STOP TAXOL, maintain plain IV and notify physician for additional orders. Taxol is considered an irritant in low doses. High dose Taxol is considered a vesicant. Check with physician if infusion should be through a central line.    Neurological assessment completed pre administration [x]   Pre-medications administered as ordered [x]   Document baseline vital signs before administration [x]   For 3 hour Taxol: Document blood pressure, pulse, respiratory rate every 15 min times 1 hour after the start of Taxol []   For 1 hour Taxol: Document blood pressure, pulse, respiratory rate 15 min after the start of Taxol [x]   Document blood pressure, pulse, respiratory rate at the completion of Taxol [x]   Other:     []

## 2021-01-13 NOTE — PLAN OF CARE
Care plan reviewed with patient. Patient verbalized understanding of the plan of care and contribute to goal setting. Problem: Infection - Central Venous Catheter-Associated Bloodstream Infection:  Goal: Will show no infection signs and symptoms  Description: Will show no infection signs and symptoms  Outcome: Met This Shift  Note: Mediport site with no redness or warmth. Skin over port intact with no signs of breakdown noted. Patient verbalizes signs/symptoms of port infection and when to notify the physician. Intervention: Central line needs assessment  Description: Central line needs assessment  Note: Discuss port maintenance, infection prevention, signs and when to call Dr   Intervention: Infection risk assessment  Description: Infection risk assessment  Note: Discuss port maintenance, infection prevention, signs and when to call Dr      Problem: Musculor/Skeletal Functional Status  Goal: Absence of falls  Outcome: Met This Shift  Note: Free from falls while in infusion center. Verbalized understanding of fall prevention and to ask for assistance with ambulation   Intervention: Fall precautions  Note: Assess for fall risk, instruct to ask for assistance with ambulation      Problem: Intellectual/Education/Knowledge Deficit  Goal: Teaching initiated upon admission  Outcome: Met This Shift  Note: Patient verbalizes understanding to verbal information given on taxol,action and possible side effects. Aware to call MD if develop complications.     Intervention: Verbal/written education provided  Note: Chemotherapy Teaching     What is Chemotherapy   Drug action [x]   Method of Administration [x]   Handouts given []     Side Effects  Nausea/vomiting [x]   Diarrhea [x]   Fatigue [x]   Signs / Symptoms of infection [x]   Neutropenia [x]   Thrombocytopenia [x]   Alopecia [x]   neuropathy [x]   [place] diet &  the importance of fluids [x]       Micellaneous  Importance of nutrition [x]   Importance of oral hygiene [x]   When to call the MD [x]   Monitoring labs [x]   Use of supportive services []     Explanation of Drug Regimen / Frequency  taxol     Comments  Verbalized understanding to drug,action,side effects and when to call MD         Problem: Discharge Planning  Goal: Knowledge of discharge instructions  Description: Knowledge of discharge instructions  Outcome: Met This Shift  Note: Verbalized understanding of discharge instructions, follow ups and when to call doctor   Intervention: Interaction with patient/family and care team  Note: Discuss discharge instructions, follow ups and when to call doctor. Intervention: Discharge to appropriate level of care  Note: Discuss discharge instructions, follow ups and when to call doctor.

## 2021-01-13 NOTE — PROGRESS NOTES
Patient assessed for the following post chemotherapy:    Dizziness   No  Lightheadedness  No      Acute nausea/vomiting No  Headache   No  Chest pain/pressure  No  Rash/itching   No  Shortness of breath  No    Patient kept for 10 minutes observation post infusion chemotherapy. Patient ride has appointment and needs to leave early  Patient tolerated chemotherapy treatment with taxol without any complications. Last vital signs:   /64   Pulse 77   Temp 98 °F (36.7 °C) (Oral)   Resp 18   Wt 119 lb 3.2 oz (54.1 kg)   SpO2 99%   BMI 18.12 kg/m²     Patient instructed if experience any of the above symptoms following today's infusion,she is to notify MD immediately or go to the emergency department. Discharge instructions given to patient. Verbalizes understanding. Ambulated off unit per self with belongings.

## 2021-01-19 RX ORDER — SODIUM CHLORIDE 9 MG/ML
INJECTION, SOLUTION INTRAVENOUS CONTINUOUS
Status: CANCELLED | OUTPATIENT
Start: 2021-01-20

## 2021-01-19 RX ORDER — MEPERIDINE HYDROCHLORIDE 25 MG/ML
12.5 INJECTION INTRAMUSCULAR; INTRAVENOUS; SUBCUTANEOUS ONCE
Status: CANCELLED | OUTPATIENT
Start: 2021-01-20 | End: 2021-01-20

## 2021-01-19 RX ORDER — DIPHENHYDRAMINE HYDROCHLORIDE 50 MG/ML
50 INJECTION INTRAMUSCULAR; INTRAVENOUS ONCE
Status: CANCELLED | OUTPATIENT
Start: 2021-01-20 | End: 2021-01-20

## 2021-01-19 RX ORDER — SODIUM CHLORIDE 0.9 % (FLUSH) 0.9 %
5 SYRINGE (ML) INJECTION PRN
Status: CANCELLED | OUTPATIENT
Start: 2021-01-20

## 2021-01-19 RX ORDER — METHYLPREDNISOLONE SODIUM SUCCINATE 125 MG/2ML
125 INJECTION, POWDER, LYOPHILIZED, FOR SOLUTION INTRAMUSCULAR; INTRAVENOUS ONCE
Status: CANCELLED | OUTPATIENT
Start: 2021-01-20 | End: 2021-01-20

## 2021-01-20 ENCOUNTER — HOSPITAL ENCOUNTER (OUTPATIENT)
Dept: INFUSION THERAPY | Age: 56
Discharge: HOME | End: 2021-01-20
Payer: COMMERCIAL

## 2021-01-20 VITALS
TEMPERATURE: 98 F | WEIGHT: 120.2 LBS | HEIGHT: 68 IN | RESPIRATION RATE: 18 BRPM | BODY MASS INDEX: 18.22 KG/M2 | SYSTOLIC BLOOD PRESSURE: 102 MMHG | OXYGEN SATURATION: 99 % | HEART RATE: 82 BPM | DIASTOLIC BLOOD PRESSURE: 53 MMHG

## 2021-01-20 DIAGNOSIS — Z51.11 ENCOUNTER FOR CHEMOTHERAPY MANAGEMENT: ICD-10-CM

## 2021-01-20 DIAGNOSIS — C50.411 MALIGNANT NEOPLASM OF UPPER-OUTER QUADRANT OF RIGHT FEMALE BREAST, UNSPECIFIED ESTROGEN RECEPTOR STATUS (HCC): Primary | ICD-10-CM

## 2021-01-20 LAB
ABSOLUTE IMMATURE GRANULOCYTE: 0.02 THOU/MM3 (ref 0–0.07)
BASINOPHIL, AUTOMATED: 1 % (ref 0–3)
BASOPHILS ABSOLUTE: 0 THOU/MM3 (ref 0–0.1)
BUN, WHOLE BLOOD: 12 MG/DL (ref 8–26)
CHLORIDE, WHOLE BLOOD: 105 MEQ/L (ref 98–109)
CREATININE, WHOLE BLOOD: 0.8 MG/DL (ref 0.5–1.2)
EOSINOPHILS ABSOLUTE: 0 THOU/MM3 (ref 0–0.4)
EOSINOPHILS RELATIVE PERCENT: 1 % (ref 0–4)
GFR, ESTIMATED ,CON: 79 ML/MIN/1.73M2
GLUCOSE, WHOLE BLOOD: 98 MG/DL (ref 70–108)
HCT VFR BLD CALC: 34.2 % (ref 37–47)
HEMOGLOBIN: 11.3 GM/DL (ref 12–16)
IMMATURE GRANULOCYTES: 1 %
IONIZED CALCIUM, WHOLE BLOOD: 1.16 MMOL/L (ref 1.12–1.32)
LYMPHOCYTES # BLD: 28 % (ref 15–47)
LYMPHOCYTES ABSOLUTE: 1 THOU/MM3 (ref 1–4.8)
MCH RBC QN AUTO: 32.8 PG (ref 26–33)
MCHC RBC AUTO-ENTMCNC: 33 GM/DL (ref 32.2–35.5)
MCV RBC AUTO: 99 FL (ref 81–99)
MONOCYTES ABSOLUTE: 0.4 THOU/MM3 (ref 0.4–1.3)
MONOCYTES: 11 % (ref 0–12)
PDW BLD-RTO: 12.8 % (ref 11.5–14.5)
PLATELET # BLD: 283 THOU/MM3 (ref 130–400)
PMV BLD AUTO: 9 FL (ref 9.4–12.4)
POTASSIUM, WHOLE BLOOD: 4.1 MEQ/L (ref 3.5–4.9)
RBC # BLD: 3.45 MILL/MM3 (ref 4.2–5.4)
SEG NEUTROPHILS: 59 % (ref 43–75)
SEGMENTED NEUTROPHILS ABSOLUTE COUNT: 2.1 THOU/MM3 (ref 1.8–7.7)
SODIUM, WHOLE BLOOD: 139 MEQ/L (ref 138–146)
TOTAL CO2, WHOLE BLOOD: 26 MEQ/L (ref 23–33)
WBC # BLD: 3.5 THOU/MM3 (ref 4.8–10.8)

## 2021-01-20 PROCEDURE — 36591 DRAW BLOOD OFF VENOUS DEVICE: CPT

## 2021-01-20 PROCEDURE — 96413 CHEMO IV INFUSION 1 HR: CPT

## 2021-01-20 PROCEDURE — 2500000003 HC RX 250 WO HCPCS: Performed by: INTERNAL MEDICINE

## 2021-01-20 PROCEDURE — 85025 COMPLETE CBC W/AUTO DIFF WBC: CPT

## 2021-01-20 PROCEDURE — 6360000002 HC RX W HCPCS: Performed by: INTERNAL MEDICINE

## 2021-01-20 PROCEDURE — 80047 BASIC METABLC PNL IONIZED CA: CPT

## 2021-01-20 PROCEDURE — 2580000003 HC RX 258: Performed by: INTERNAL MEDICINE

## 2021-01-20 PROCEDURE — 96375 TX/PRO/DX INJ NEW DRUG ADDON: CPT

## 2021-01-20 RX ORDER — SODIUM CHLORIDE 9 MG/ML
20 INJECTION, SOLUTION INTRAVENOUS ONCE
Status: COMPLETED | OUTPATIENT
Start: 2021-01-20 | End: 2021-01-20

## 2021-01-20 RX ORDER — SODIUM CHLORIDE 0.9 % (FLUSH) 0.9 %
10 SYRINGE (ML) INJECTION PRN
Status: DISCONTINUED | OUTPATIENT
Start: 2021-01-20 | End: 2021-01-21 | Stop reason: HOSPADM

## 2021-01-20 RX ORDER — HEPARIN SODIUM (PORCINE) LOCK FLUSH IV SOLN 100 UNIT/ML 100 UNIT/ML
500 SOLUTION INTRAVENOUS PRN
Status: DISCONTINUED | OUTPATIENT
Start: 2021-01-20 | End: 2021-01-21 | Stop reason: HOSPADM

## 2021-01-20 RX ORDER — ONDANSETRON 2 MG/ML
8 INJECTION INTRAMUSCULAR; INTRAVENOUS ONCE
Status: DISCONTINUED | OUTPATIENT
Start: 2021-01-20 | End: 2021-01-21 | Stop reason: HOSPADM

## 2021-01-20 RX ORDER — DIPHENHYDRAMINE HYDROCHLORIDE 50 MG/ML
25 INJECTION INTRAMUSCULAR; INTRAVENOUS ONCE
Status: COMPLETED | OUTPATIENT
Start: 2021-01-20 | End: 2021-01-20

## 2021-01-20 RX ORDER — DEXAMETHASONE SODIUM PHOSPHATE 4 MG/ML
10 INJECTION, SOLUTION INTRA-ARTICULAR; INTRALESIONAL; INTRAMUSCULAR; INTRAVENOUS; SOFT TISSUE ONCE
Status: COMPLETED | OUTPATIENT
Start: 2021-01-20 | End: 2021-01-20

## 2021-01-20 RX ADMIN — SODIUM CHLORIDE, PRESERVATIVE FREE 10 ML: 5 INJECTION INTRAVENOUS at 11:22

## 2021-01-20 RX ADMIN — SODIUM CHLORIDE, PRESERVATIVE FREE 10 ML: 5 INJECTION INTRAVENOUS at 09:39

## 2021-01-20 RX ADMIN — DIPHENHYDRAMINE HYDROCHLORIDE 25 MG: 50 INJECTION, SOLUTION INTRAMUSCULAR; INTRAVENOUS at 09:44

## 2021-01-20 RX ADMIN — SODIUM CHLORIDE, PRESERVATIVE FREE 10 ML: 5 INJECTION INTRAVENOUS at 08:51

## 2021-01-20 RX ADMIN — FAMOTIDINE 20 MG: 10 INJECTION INTRAVENOUS at 09:41

## 2021-01-20 RX ADMIN — Medication 500 UNITS: at 11:22

## 2021-01-20 RX ADMIN — DEXAMETHASONE SODIUM PHOSPHATE 10 MG: 4 INJECTION, SOLUTION INTRA-ARTICULAR; INTRALESIONAL; INTRAMUSCULAR; INTRAVENOUS; SOFT TISSUE at 09:47

## 2021-01-20 RX ADMIN — SODIUM CHLORIDE, PRESERVATIVE FREE 10 ML: 5 INJECTION INTRAVENOUS at 09:42

## 2021-01-20 RX ADMIN — PACLITAXEL 126 MG: 6 INJECTION, SOLUTION, CONCENTRATE INTRAVENOUS at 09:53

## 2021-01-20 RX ADMIN — SODIUM CHLORIDE 20 ML/HR: 9 INJECTION, SOLUTION INTRAVENOUS at 09:39

## 2021-01-20 RX ADMIN — SODIUM CHLORIDE, PRESERVATIVE FREE 10 ML: 5 INJECTION INTRAVENOUS at 08:50

## 2021-01-20 NOTE — PROGRESS NOTES
Patient assessed for the following post chemotherapy:    Dizziness   No  Lightheadedness  No      Acute nausea/vomiting No  Headache   No  Chest pain/pressure  No  Rash/itching   No  Shortness of breath  No    Patient kept for 20 minutes observation post infusion chemotherapy. Patient tolerated chemotherapy treatment taxol without any complications. Last vital signs:   BP (!) 102/53   Pulse 82   Temp 98 °F (36.7 °C) (Oral)   Resp 18   Ht 5' 8\" (1.727 m)   Wt 120 lb 3.2 oz (54.5 kg)   SpO2 99%   BMI 18.28 kg/m²         Patient instructed if experience any of the above symptoms following today's infusion,he/she is to notify MD immediately or go to the emergency department. Discharge instructions given to patient. Verbalizes understanding. Ambulated off unit per self with belongings.

## 2021-01-20 NOTE — PROGRESS NOTES
Chemotherapy Administration    Pre-assessment Data: Antineoplastic Agents  Other:   See toxicity flow sheet for assessment [x]     Physician Notification of Concerns Related to Chemotherapy Administration:   Physician Notified Carlos Eduardo Marc / Time of Notification      Interventions:   Lab work assessed  [x]   Height / Weight verified for dose [x]   Current MAR reviewed [x]   Emergency drugs available as appropriate [x]   Anaphylaxis assessment completed [x]   Pre-medications administered as ordered [x]   Blood return noted upon initiation of chemotherapy [x]   Blood return noted each 1-2ml of a vesicant medication if given IV push []   Blood return noted each 2-3ml of a non-vesicant medication if given IV push []   Monitor for signs / symptoms of hypersensitivity reaction [x]   Chemotherapy orders (drug/dose/rate) verified by 2 Chemo certified RNs [x]   Monitor IV site and blood return throughout the infusion of the medication [x]   Document IV site checks on the IV assessment form [x]   Document chemotherapy teaching on the Patient Education tab [x]   Document patient verbalizes understanding of medications being administered [x]   If IV infiltration, see ONS Guidelines []   Other:      []         Taxol Administration:  Taxol is filtered, use specific tubing for administration. If hypersensitivity reaction occurs, STOP TAXOL, maintain plain IV and notify physician for additional orders. Taxol is considered an irritant in low doses. High dose Taxol is considered a vesicant. Check with physician if infusion should be through a central line.    Neurological assessment completed pre administration [x]   Pre-medications administered as ordered [x]   Document baseline vital signs before administration [x]   For 3 hour Taxol: Document blood pressure, pulse, respiratory rate every 15 min times 1 hour after the start of Taxol []   For 1 hour Taxol: Document blood pressure, pulse, respiratory rate 15 min after the start of Taxol [x]   Document blood pressure, pulse, respiratory rate at the completion of Taxol [x]   Other:     []

## 2021-01-20 NOTE — PLAN OF CARE
Care plan reviewed with patient. Patient verbalized understanding of the plan of care and contribute to goal setting. Problem: Falls - Risk of:  Goal: Will remain free from falls  Description: Will remain free from falls  Outcome: Met This Shift  Note: Free from falls while in infusion center. Verbalized understanding of fall prevention and to ask for assistance with ambulation   Intervention: Assess risk factors for falls  Description: Assess risk factors for falls  Note: Assess for fall risk, instruct to ask for assistance with ambulation      Problem: Intellectual/Education/Knowledge Deficit  Goal: Teaching initiated upon admission  Outcome: Met This Shift  Note: Patient verbalizes understanding to verbal information given on taxol,action and possible side effects. Aware to call MD if develop complications. Intervention: Verbal/written education provided  Note: Chemotherapy Teaching     What is Chemotherapy   Drug action [x]   Method of Administration [x]   Handouts given []     Side Effects  Nausea/vomiting [x]   Diarrhea [x]   Fatigue [x]   Signs / Symptoms of infection [x]   Neutropenia [x]   Thrombocytopenia [x]   Alopecia [x]   neuropathy [x]   [place] diet &  the importance of fluids [x]       Micellaneous  Importance of nutrition [x]   Importance of oral hygiene [x]   When to call the MD [x]   Monitoring labs [x]   Use of supportive services []     Explanation of Drug Regimen / Frequency  taxol     Comments  Verbalized understanding to drug,action,side effects and when to call MD         Problem: Discharge Planning  Goal: Knowledge of discharge instructions  Description: Knowledge of discharge instructions  Outcome: Met This Shift  Note: Verbalized understanding of discharge instructions, follow ups and when to call doctor   Intervention: Discharge to appropriate level of care  Note: Discuss discharge instructions, follow ups and when to call doctor.

## 2021-01-26 RX ORDER — SODIUM CHLORIDE 0.9 % (FLUSH) 0.9 %
5 SYRINGE (ML) INJECTION PRN
Status: CANCELLED | OUTPATIENT
Start: 2021-01-27

## 2021-01-26 RX ORDER — METHYLPREDNISOLONE SODIUM SUCCINATE 125 MG/2ML
125 INJECTION, POWDER, LYOPHILIZED, FOR SOLUTION INTRAMUSCULAR; INTRAVENOUS ONCE
Status: CANCELLED | OUTPATIENT
Start: 2021-01-27 | End: 2021-01-27

## 2021-01-26 RX ORDER — DIPHENHYDRAMINE HYDROCHLORIDE 50 MG/ML
50 INJECTION INTRAMUSCULAR; INTRAVENOUS ONCE
Status: CANCELLED | OUTPATIENT
Start: 2021-01-27 | End: 2021-01-27

## 2021-01-26 RX ORDER — MEPERIDINE HYDROCHLORIDE 25 MG/ML
12.5 INJECTION INTRAMUSCULAR; INTRAVENOUS; SUBCUTANEOUS ONCE
Status: CANCELLED | OUTPATIENT
Start: 2021-01-27 | End: 2021-01-27

## 2021-01-26 RX ORDER — SODIUM CHLORIDE 9 MG/ML
INJECTION, SOLUTION INTRAVENOUS CONTINUOUS
Status: CANCELLED | OUTPATIENT
Start: 2021-01-27

## 2021-01-27 ENCOUNTER — HOSPITAL ENCOUNTER (OUTPATIENT)
Dept: INFUSION THERAPY | Age: 56
Discharge: HOME | End: 2021-01-27
Payer: COMMERCIAL

## 2021-01-27 ENCOUNTER — OFFICE VISIT (OUTPATIENT)
Dept: ONCOLOGY | Age: 56
End: 2021-01-27
Payer: COMMERCIAL

## 2021-01-27 VITALS
RESPIRATION RATE: 18 BRPM | SYSTOLIC BLOOD PRESSURE: 118 MMHG | HEART RATE: 87 BPM | OXYGEN SATURATION: 100 % | TEMPERATURE: 98 F | DIASTOLIC BLOOD PRESSURE: 59 MMHG

## 2021-01-27 VITALS
SYSTOLIC BLOOD PRESSURE: 112 MMHG | HEART RATE: 86 BPM | DIASTOLIC BLOOD PRESSURE: 57 MMHG | TEMPERATURE: 98.1 F | BODY MASS INDEX: 18.31 KG/M2 | OXYGEN SATURATION: 99 % | WEIGHT: 120.8 LBS | HEIGHT: 68 IN | RESPIRATION RATE: 16 BRPM

## 2021-01-27 DIAGNOSIS — C50.411 MALIGNANT NEOPLASM OF UPPER-OUTER QUADRANT OF RIGHT FEMALE BREAST, UNSPECIFIED ESTROGEN RECEPTOR STATUS (HCC): Primary | ICD-10-CM

## 2021-01-27 DIAGNOSIS — T45.1X5A ANEMIA ASSOCIATED WITH CHEMOTHERAPY: ICD-10-CM

## 2021-01-27 DIAGNOSIS — Z51.11 ENCOUNTER FOR CHEMOTHERAPY MANAGEMENT: ICD-10-CM

## 2021-01-27 DIAGNOSIS — D70.1 LEUKOPENIA DUE TO ANTINEOPLASTIC CHEMOTHERAPY (HCC): ICD-10-CM

## 2021-01-27 DIAGNOSIS — D64.81 ANEMIA ASSOCIATED WITH CHEMOTHERAPY: ICD-10-CM

## 2021-01-27 DIAGNOSIS — T45.1X5A LEUKOPENIA DUE TO ANTINEOPLASTIC CHEMOTHERAPY (HCC): ICD-10-CM

## 2021-01-27 LAB
ABSOLUTE IMMATURE GRANULOCYTE: 0.02 THOU/MM3 (ref 0–0.07)
ALBUMIN SERPL-MCNC: 3.9 G/DL (ref 3.5–5.1)
ALP BLD-CCNC: 49 U/L (ref 38–126)
ALT SERPL-CCNC: 12 U/L (ref 11–66)
AST SERPL-CCNC: 21 U/L (ref 5–40)
BASINOPHIL, AUTOMATED: 1 % (ref 0–3)
BASOPHILS ABSOLUTE: 0 THOU/MM3 (ref 0–0.1)
BILIRUB SERPL-MCNC: 0.2 MG/DL (ref 0.3–1.2)
BILIRUBIN DIRECT: < 0.2 MG/DL (ref 0–0.3)
BUN, WHOLE BLOOD: 15 MG/DL (ref 8–26)
CHLORIDE, WHOLE BLOOD: 104 MEQ/L (ref 98–109)
CREATININE, WHOLE BLOOD: 0.8 MG/DL (ref 0.5–1.2)
EOSINOPHILS ABSOLUTE: 0 THOU/MM3 (ref 0–0.4)
EOSINOPHILS RELATIVE PERCENT: 1 % (ref 0–4)
GFR, ESTIMATED ,CON: 79 ML/MIN/1.73M2
GLUCOSE, WHOLE BLOOD: 96 MG/DL (ref 70–108)
HCT VFR BLD CALC: 34.7 % (ref 37–47)
HEMOGLOBIN: 11.5 GM/DL (ref 12–16)
IMMATURE GRANULOCYTES: 1 %
IONIZED CALCIUM, WHOLE BLOOD: 1.18 MMOL/L (ref 1.12–1.32)
LYMPHOCYTES # BLD: 29 % (ref 15–47)
LYMPHOCYTES ABSOLUTE: 0.9 THOU/MM3 (ref 1–4.8)
MCH RBC QN AUTO: 32.5 PG (ref 26–33)
MCHC RBC AUTO-ENTMCNC: 33.1 GM/DL (ref 32.2–35.5)
MCV RBC AUTO: 98 FL (ref 81–99)
MONOCYTES ABSOLUTE: 0.3 THOU/MM3 (ref 0.4–1.3)
MONOCYTES: 11 % (ref 0–12)
PDW BLD-RTO: 12.5 % (ref 11.5–14.5)
PLATELET # BLD: 269 THOU/MM3 (ref 130–400)
PMV BLD AUTO: 8.8 FL (ref 9.4–12.4)
POTASSIUM, WHOLE BLOOD: 4.2 MEQ/L (ref 3.5–4.9)
RBC # BLD: 3.54 MILL/MM3 (ref 4.2–5.4)
SEG NEUTROPHILS: 58 % (ref 43–75)
SEGMENTED NEUTROPHILS ABSOLUTE COUNT: 1.8 THOU/MM3 (ref 1.8–7.7)
SODIUM, WHOLE BLOOD: 139 MEQ/L (ref 138–146)
TOTAL CO2, WHOLE BLOOD: 26 MEQ/L (ref 23–33)
TOTAL PROTEIN: 6.4 G/DL (ref 6.1–8)
WBC # BLD: 3.1 THOU/MM3 (ref 4.8–10.8)

## 2021-01-27 PROCEDURE — 2500000003 HC RX 250 WO HCPCS: Performed by: INTERNAL MEDICINE

## 2021-01-27 PROCEDURE — 96413 CHEMO IV INFUSION 1 HR: CPT

## 2021-01-27 PROCEDURE — 96375 TX/PRO/DX INJ NEW DRUG ADDON: CPT

## 2021-01-27 PROCEDURE — 99215 OFFICE O/P EST HI 40 MIN: CPT | Performed by: INTERNAL MEDICINE

## 2021-01-27 PROCEDURE — 85025 COMPLETE CBC W/AUTO DIFF WBC: CPT

## 2021-01-27 PROCEDURE — 99211 OFF/OP EST MAY X REQ PHY/QHP: CPT

## 2021-01-27 PROCEDURE — 80047 BASIC METABLC PNL IONIZED CA: CPT

## 2021-01-27 PROCEDURE — 36591 DRAW BLOOD OFF VENOUS DEVICE: CPT

## 2021-01-27 PROCEDURE — 80076 HEPATIC FUNCTION PANEL: CPT

## 2021-01-27 PROCEDURE — 2580000003 HC RX 258: Performed by: INTERNAL MEDICINE

## 2021-01-27 PROCEDURE — 6360000002 HC RX W HCPCS: Performed by: INTERNAL MEDICINE

## 2021-01-27 RX ORDER — SODIUM CHLORIDE 9 MG/ML
20 INJECTION, SOLUTION INTRAVENOUS ONCE
Status: COMPLETED | OUTPATIENT
Start: 2021-01-27 | End: 2021-01-27

## 2021-01-27 RX ORDER — DEXAMETHASONE SODIUM PHOSPHATE 4 MG/ML
10 INJECTION, SOLUTION INTRA-ARTICULAR; INTRALESIONAL; INTRAMUSCULAR; INTRAVENOUS; SOFT TISSUE ONCE
Status: COMPLETED | OUTPATIENT
Start: 2021-01-27 | End: 2021-01-27

## 2021-01-27 RX ORDER — HEPARIN SODIUM (PORCINE) LOCK FLUSH IV SOLN 100 UNIT/ML 100 UNIT/ML
500 SOLUTION INTRAVENOUS PRN
Status: DISCONTINUED | OUTPATIENT
Start: 2021-01-27 | End: 2021-01-28 | Stop reason: HOSPADM

## 2021-01-27 RX ORDER — SODIUM CHLORIDE 0.9 % (FLUSH) 0.9 %
10 SYRINGE (ML) INJECTION PRN
Status: DISCONTINUED | OUTPATIENT
Start: 2021-01-27 | End: 2021-01-28 | Stop reason: HOSPADM

## 2021-01-27 RX ORDER — ONDANSETRON 2 MG/ML
8 INJECTION INTRAMUSCULAR; INTRAVENOUS ONCE
Status: COMPLETED | OUTPATIENT
Start: 2021-01-27 | End: 2021-01-27

## 2021-01-27 RX ORDER — DIPHENHYDRAMINE HYDROCHLORIDE 50 MG/ML
25 INJECTION INTRAMUSCULAR; INTRAVENOUS ONCE
Status: COMPLETED | OUTPATIENT
Start: 2021-01-27 | End: 2021-01-27

## 2021-01-27 RX ADMIN — SODIUM CHLORIDE, PRESERVATIVE FREE 10 ML: 5 INJECTION INTRAVENOUS at 08:55

## 2021-01-27 RX ADMIN — SODIUM CHLORIDE, PRESERVATIVE FREE 10 ML: 5 INJECTION INTRAVENOUS at 11:29

## 2021-01-27 RX ADMIN — DIPHENHYDRAMINE HYDROCHLORIDE 25 MG: 50 INJECTION, SOLUTION INTRAMUSCULAR; INTRAVENOUS at 10:02

## 2021-01-27 RX ADMIN — FAMOTIDINE 20 MG: 10 INJECTION, SOLUTION INTRAVENOUS at 09:55

## 2021-01-27 RX ADMIN — SODIUM CHLORIDE 20 ML/HR: 9 INJECTION, SOLUTION INTRAVENOUS at 09:50

## 2021-01-27 RX ADMIN — SODIUM CHLORIDE, PRESERVATIVE FREE 10 ML: 5 INJECTION INTRAVENOUS at 08:56

## 2021-01-27 RX ADMIN — Medication 500 UNITS: at 11:29

## 2021-01-27 RX ADMIN — PACLITAXEL 126 MG: 6 INJECTION, SOLUTION, CONCENTRATE INTRAVENOUS at 10:13

## 2021-01-27 RX ADMIN — SODIUM CHLORIDE, PRESERVATIVE FREE 10 ML: 5 INJECTION INTRAVENOUS at 09:50

## 2021-01-27 RX ADMIN — DEXAMETHASONE SODIUM PHOSPHATE 10 MG: 4 INJECTION, SOLUTION INTRAMUSCULAR; INTRAVENOUS at 10:06

## 2021-01-27 RX ADMIN — SODIUM CHLORIDE, PRESERVATIVE FREE 10 ML: 5 INJECTION INTRAVENOUS at 09:55

## 2021-01-27 RX ADMIN — ONDANSETRON 8 MG: 2 INJECTION INTRAMUSCULAR; INTRAVENOUS at 09:59

## 2021-01-27 NOTE — PATIENT INSTRUCTIONS
1.  Final planned chemotherapy today.   2.  Patient to have surgery at [facility] as next step of therapy, therefore no scheduled follow up at this time

## 2021-01-27 NOTE — PROGRESS NOTES
Chemotherapy Administration    Pre-assessment Data: Antineoplastic Agents  Other:   See toxicity flow sheet for assessment [x]     Physician Notification of Concerns Related to Chemotherapy Administration:   Physician Notified Manuel Inch / Time of Notification Patient saw Dr Caesar Ross today     Interventions:   Lab work assessed  [x]   Height / Weight verified for dose [x]   Current MAR reviewed [x]   Emergency drugs available as appropriate [x]   Anaphylaxis assessment completed [x]   Pre-medications administered as ordered [x]   Blood return noted upon initiation of chemotherapy [x]   Blood return noted each 1-2ml of a vesicant medication if given IV push []   Blood return noted each 2-3ml of a non-vesicant medication if given IV push []   Monitor for signs / symptoms of hypersensitivity reaction [x]   Chemotherapy orders (drug/dose/rate) verified by 2 Chemo certified RNs [x]   Monitor IV site and blood return throughout the infusion of the medication [x]   Document IV site checks on the IV assessment form [x]   Document chemotherapy teaching on the Patient Education tab [x]   Document patient verbalizes understanding of medications being administered [x]   If IV infiltration, see ONS Guidelines []   Other:      []         Taxol Administration:  Taxol is filtered, use specific tubing for administration. If hypersensitivity reaction occurs, STOP TAXOL, maintain plain IV and notify physician for additional orders. Taxol is considered an irritant in low doses. High dose Taxol is considered a vesicant. Check with physician if infusion should be through a central line.    Neurological assessment completed pre administration [x]   Pre-medications administered as ordered [x]   Document baseline vital signs before administration [x]   For 3 hour Taxol: Document blood pressure, pulse, respiratory rate every 15 min times 1 hour after the start of Taxol []   For 1 hour Taxol: Document blood pressure, pulse, respiratory rate 15 min after the start of Taxol [x]   Document blood pressure, pulse, respiratory rate at the completion of Taxol [x]   Other:     []

## 2021-01-27 NOTE — PLAN OF CARE

## 2021-01-27 NOTE — PROGRESS NOTES
Patient assessed for the following post chemotherapy:    Dizziness   No  Lightheadedness  No      Acute nausea/vomiting No  Headache   No  Chest pain/pressure  No  Rash/itching   No  Shortness of breath  No    Patient kept for 20 minutes observation post infusion chemotherapy. Patient tolerated chemotherapy treatment taxol without any complications. Last vital signs:   BP (!) 118/59   Pulse 87   Temp 98 °F (36.7 °C) (Oral)   Resp 18   SpO2 100%     Patient instructed if experience any of the above symptoms following today's infusion,she is to notify MD immediately or go to the emergency department. Discharge instructions given to patient. Verbalizes understanding. Ambulated off unit per self with belongings.

## 2021-02-15 ENCOUNTER — TELEPHONE (OUTPATIENT)
Dept: ONCOLOGY | Age: 56
End: 2021-02-15

## 2021-02-15 NOTE — TELEPHONE ENCOUNTER
Patient has had muscle pain in her arms and legs that started two weeks ago, soon after she finished chemotherapy. Rates it at a 3 on a 1-10 scale and describes it as aching and sore. Says the pain is worse when moving around and especially when using stairs. States she did not have this pain during treatment. Please advise.

## 2021-02-19 NOTE — PROGRESS NOTES
[facility]  [facility] OF [facility]  [facility] [address] [facility] [address]  [address] [facility] [address] [facility] [address]  Dept: [phone]  Dept Fax: [phone]: [phone]     Referring Physician:  Dr. Kolton Cordon    Subjective: Eyes: Negative. Respiratory: Negative. Cardiovascular: Negative. Gastrointestinal: Negative. Genitourinary: Negative. Musculoskeletal: Negative. Skin: Negative. Neurological: General weakness. Hematological: Negative. Psychiatric/Behavioral: Negative. Objective:   Physical Exam  Vitals:    01/27/21 0900   BP: (!) 112/57   Pulse: 86   Resp: 16   Temp: 98.1 °F (36.7 °C)   SpO2: 99%   Vitals reviewed and are stable. Constitutional: Well-developed. No acute distress. HENT: Normocephalic and atraumatic. Eyes: Pupils appear equal and reactive. Neck: Overall appearance is symmetrical. No identifiable masses. Pulmonary: Effort normal. No respiratory distress. .  Neurological: Alert and oriented to person, place, and time. Judgment and thought content normal.  Skin: Skin is warm and dry. No rash. Psychiatric: Mood and affect appropriate for the clinical situation. Data Analysis:    Hematology 1/27/2021 1/20/2021 1/13/2021   WBC 3.1 (L) 3.5 (L) 2.7 (L)   RBC 3.54 (L) 3.45 (L) 3.49 (L)   HGB 11.5 (L) 11.3 (L) 11.5 (L)   HCT 34.7 (L) 34.2 (L) 34.5 (L)   MCV 98 99 99   RDW 12.5 12.8 12.6    283 273     Assessment:   1. Recurrent invasive ductal carcinoma of the right breast.  2.  Leukopenia. 3.  Anemia. 4.  Chemotherapy encounter. Recommendations:   1. Proceed with cycle #12 of Taxol chemotherapy. 2.  Monitor total WBC count and for signs of infection/fever. 3.  Monitor hemoglobin/hematocrit and for any signs of blood loss. 4.  Monitor for side effects and toxicity from chemotherapy. 5.  Monitor for response of malignancy. 6.  Follow-up at [facility] for planned surgical intervention as scheduled. Iona Philip M.D.                                                                          Medical Director: [facility]  Cancer [facility] [address], [address], [address] [place] [address], [address] of the [facility] Rissa DUFF at the [facility]      **This report has been created using voice recognition software. It may contain minor errors which are inherent in voice recognition technology. **

## 2021-06-17 RX ORDER — SODIUM CHLORIDE 450 MG/100ML
INJECTION, SOLUTION INTRAVENOUS CONTINUOUS
Status: CANCELLED | OUTPATIENT
Start: 2021-06-17

## 2021-06-17 RX ORDER — CLINDAMYCIN PHOSPHATE 600 MG/50ML
600 INJECTION INTRAVENOUS
Status: CANCELLED | OUTPATIENT
Start: 2021-06-17

## 2021-06-18 ENCOUNTER — HOSPITAL ENCOUNTER (OUTPATIENT)
Dept: INTERVENTIONAL RADIOLOGY/VASCULAR | Age: 56
Discharge: HOME | End: 2021-06-18
Payer: COMMERCIAL

## 2021-06-18 VITALS
DIASTOLIC BLOOD PRESSURE: 56 MMHG | RESPIRATION RATE: 18 BRPM | HEIGHT: 68 IN | WEIGHT: 115 LBS | BODY MASS INDEX: 17.43 KG/M2 | HEART RATE: 67 BPM | OXYGEN SATURATION: 100 % | SYSTOLIC BLOOD PRESSURE: 97 MMHG | TEMPERATURE: 97.6 F

## 2021-06-18 PROCEDURE — 2580000003 HC RX 258: Performed by: RADIOLOGY

## 2021-06-18 PROCEDURE — 2500000003 HC RX 250 WO HCPCS: Performed by: RADIOLOGY

## 2021-06-18 PROCEDURE — 2709999900 IR FLUORO GUIDED CVA DEVICE PLMT/REPLACE/REMOVAL

## 2021-06-18 PROCEDURE — 6360000002 HC RX W HCPCS: Performed by: RADIOLOGY

## 2021-06-18 RX ORDER — CLINDAMYCIN PHOSPHATE 600 MG/50ML
600 INJECTION INTRAVENOUS
Status: COMPLETED | OUTPATIENT
Start: 2021-06-18 | End: 2021-06-18

## 2021-06-18 RX ORDER — FENTANYL CITRATE 50 UG/ML
50 INJECTION, SOLUTION INTRAMUSCULAR; INTRAVENOUS ONCE
Status: COMPLETED | OUTPATIENT
Start: 2021-06-18 | End: 2021-06-18

## 2021-06-18 RX ORDER — MIDAZOLAM HYDROCHLORIDE 1 MG/ML
1 INJECTION INTRAMUSCULAR; INTRAVENOUS ONCE
Status: COMPLETED | OUTPATIENT
Start: 2021-06-18 | End: 2021-06-18

## 2021-06-18 RX ORDER — SODIUM CHLORIDE 450 MG/100ML
INJECTION, SOLUTION INTRAVENOUS CONTINUOUS
Status: DISCONTINUED | OUTPATIENT
Start: 2021-06-18 | End: 2021-06-19 | Stop reason: HOSPADM

## 2021-06-18 RX ORDER — MIDAZOLAM HYDROCHLORIDE 1 MG/ML
0.5 INJECTION INTRAMUSCULAR; INTRAVENOUS ONCE
Status: COMPLETED | OUTPATIENT
Start: 2021-06-18 | End: 2021-06-18

## 2021-06-18 RX ORDER — FENTANYL CITRATE 50 UG/ML
25 INJECTION, SOLUTION INTRAMUSCULAR; INTRAVENOUS ONCE
Status: COMPLETED | OUTPATIENT
Start: 2021-06-18 | End: 2021-06-18

## 2021-06-18 RX ORDER — LETROZOLE 2.5 MG/1
2.5 TABLET, FILM COATED ORAL DAILY
COMMUNITY

## 2021-06-18 RX ADMIN — FENTANYL CITRATE 25 MCG: 50 INJECTION, SOLUTION INTRAMUSCULAR; INTRAVENOUS at 08:23

## 2021-06-18 RX ADMIN — MIDAZOLAM 0.5 MG: 1 INJECTION INTRAMUSCULAR; INTRAVENOUS at 08:22

## 2021-06-18 RX ADMIN — MIDAZOLAM 0.5 MG: 1 INJECTION INTRAMUSCULAR; INTRAVENOUS at 08:17

## 2021-06-18 RX ADMIN — CLINDAMYCIN PHOSPHATE 600 MG: 600 INJECTION, SOLUTION INTRAVENOUS at 07:22

## 2021-06-18 RX ADMIN — FENTANYL CITRATE 25 MCG: 50 INJECTION, SOLUTION INTRAMUSCULAR; INTRAVENOUS at 08:18

## 2021-06-18 RX ADMIN — SODIUM CHLORIDE: 4.5 INJECTION, SOLUTION INTRAVENOUS at 07:21

## 2021-06-18 ASSESSMENT — PAIN - FUNCTIONAL ASSESSMENT: PAIN_FUNCTIONAL_ASSESSMENT: 0-10

## 2021-06-18 ASSESSMENT — PAIN SCALES - GENERAL
PAINLEVEL_OUTOF10: 0

## 2021-06-18 NOTE — H&P
[address] [address]  Sedation/Analgesia History & Physical    Pt Name: Don Michelle  MRN: [id number]  YOB: 1965  Provider Performing Procedure: Saturnino Anglin MD, MD  Primary Care Physician: BHASKAR Arzola - MARY BETH    PRE-PROCEDURE   DNR-CCA/DNR-CC []Yes [x]No  Brief History/Pre-Procedure Diagnosis: History of breast cancer. Therapy has concluded. MEDICAL HISTORY  []CAD/Valve  []Liver Disease  []Lung Disease []Diabetes  []Hypertension []Renal Disease  []Additional information:       has a past medical history of Cancer ([facility] [address] and GERD (gastroesophageal reflux disease). SURGICAL HISTORY   has a past surgical history that includes Rotator cuff repair (Left); Appendectomy; and Breast lumpectomy (Right, 01/19/2010). Additional information:       ALLERGIES   Allergies as of 06/18/2021 - Fully Reviewed 06/18/2021   Allergen Reaction Noted    Other Itching and Rash 09/16/2020    Compazine [prochlorperazine] Other (See Comments) 01/04/2018    Nakul Francos hcl] Other (See Comments) 01/20/2021     Additional information:       MEDICATIONS   Coumadin Use Last 5 Days [x]No []Yes  Antiplatelet drug therapy use last 5 days  [x]No []Yes  Other anticoagulant use last 5 days  [x]No []Yes    Current Outpatient Medications:     letrozole (FEMARA) 2.5 MG tablet, Take 2.5 mg by mouth daily, Disp: , Rfl:     lidocaine-prilocaine (EMLA) 2.5-2.5 % cream, Apply topically as needed. , Disp: 1 Tube, Rfl: 5    sucralfate (CARAFATE) 1 GM tablet, Take 1 g by mouth 2 times daily, Disp: , Rfl:     omeprazole (PRILOSEC) 20 MG delayed release capsule, Take 20 mg by mouth daily, Disp: , Rfl:     escitalopram (LEXAPRO) 10 MG tablet, Take 10 mg by mouth daily, Disp: , Rfl:     Prosthesis MISC, by Does not apply route Full Cranial Prosthesis DX C50.411, Disp: 1 each, Rfl: 0    Current Facility-Administered Medications:     0.45 % sodium chloride infusion, , Intravenous, Continuous, Von Enter Adirel De La Garza MD, Last Rate: 20 mL/hr at 06/18/21 0721, New Bag at 06/18/21 0721    midazolam (VERSED) injection 1 mg, 1 mg, Intravenous, Once, Hi Stack MD    fentaNYL (SUBLIMAZE) injection 50 mcg, 50 mcg, Intravenous, Once, Hi Stack MD  Prior to Admission medications    Medication Sig Start Date End Date Taking? Authorizing Provider   letrozole (FEMARA) 2.5 MG tablet Take 2.5 mg by mouth daily   Yes Historical Provider, MD   lidocaine-prilocaine (EMLA) 2.5-2.5 % cream Apply topically as needed. 9/9/20  Yes Moncho Dunbar MD   sucralfate (CARAFATE) 1 GM tablet Take 1 g by mouth 2 times daily   Yes Historical Provider, MD   omeprazole (PRILOSEC) 20 MG delayed release capsule Take 20 mg by mouth daily   Yes Historical Provider, MD   escitalopram (LEXAPRO) 10 MG tablet Take 10 mg by mouth daily   Yes Historical Provider, MD   Prosthesis MISC by Does not apply route Full Cranial Prosthesis  DX C50.411 9/30/20   Moncho Dunbar MD     Additional information:       VITAL SIGNS   Vitals:    06/18/21 0815   BP: (!) 103/59   Pulse: 70   Resp: 18   Temp:    SpO2: 99%       PHYSICAL:   Heart:  [x]Regular rate and rhythm  []Other:    Lungs:  [x]Clear    []Other:    Abdomen: [x]Soft    []Other:    Mental Status: [x]Alert & Oriented  []Other:      PLANNED PROCEDURE   []Biospy []Arteriogram              []Drainage   [x]Mediport Removal  []Fistulogram []IV access       []Vertebroplasty / Augmentation  []IVC filter []Dialysis catheter []Biliary drainage  []Other: []CAPD Catheter []Nephrostomy Tube / Stent  SEDATION  Planned agent:[x]Midazolam []Meperidine [x]Sublimaze []Dilaudid []Morphine     []Diazepam  []Other:     ASA Classification:  []1 [x]2 []3 []4 []5  Class 1: A normal healthy patient  Class 2: Pt with mild to moderate systemic disease  Class 3: Severe systemic disease or disturbance  Class 4: Severe systemic disorders that are already life threatening.   Class 5: Moribund pt with little chances of

## 2021-06-18 NOTE — H&P
Formulation and discussion of sedation / procedure plans, risks, benefits, side effects and alternatives with patient and/or responsible adult completed.     Electronically signed by Rafael Burks MD on 6/18/2021 at 8:16 AM

## 2021-06-18 NOTE — PROGRESS NOTES
Patient in recovery, vitals are stable. She denies pain. Mediport removal dressing is dry and intact.

## 2021-06-18 NOTE — PROGRESS NOTES
1964 Patient received in IR for mediport removal.   0800 This procedure has been fully reviewed with the patient and written informed consent has been obtained. 1146 Procedure started with Dr. Travis Olszewski. 1058 Procedure completed; patient tolerated well. Dressing to right chest; no bleeding noted. 7595 Patient on cart; comfort ensured. 6717 Patient taken to OPN via cart.

## 2023-12-19 ENCOUNTER — HOSPITAL ENCOUNTER (OUTPATIENT)
Dept: INFUSION THERAPY | Age: 58
Discharge: HOME | End: 2023-12-19
Payer: COMMERCIAL

## 2023-12-19 VITALS
HEART RATE: 76 BPM | DIASTOLIC BLOOD PRESSURE: 60 MMHG | WEIGHT: 117.4 LBS | BODY MASS INDEX: 17.79 KG/M2 | SYSTOLIC BLOOD PRESSURE: 118 MMHG | OXYGEN SATURATION: 98 % | RESPIRATION RATE: 20 BRPM | TEMPERATURE: 98 F | HEIGHT: 68 IN

## 2023-12-19 DIAGNOSIS — Z17.0 MALIGNANT NEOPLASM OF UPPER-OUTER QUADRANT OF RIGHT BREAST IN FEMALE, ESTROGEN RECEPTOR POSITIVE (HCC): ICD-10-CM

## 2023-12-19 DIAGNOSIS — C50.411 MALIGNANT NEOPLASM OF UPPER-OUTER QUADRANT OF RIGHT BREAST IN FEMALE, ESTROGEN RECEPTOR POSITIVE (HCC): ICD-10-CM

## 2023-12-19 LAB
ALBUMIN SERPL BCG-MCNC: 4.6 G/DL (ref 3.5–5.1)
ALP SERPL-CCNC: 58 U/L (ref 38–126)
ALT SERPL W/O P-5'-P-CCNC: 13 U/L (ref 11–66)
AST SERPL-CCNC: 21 U/L (ref 5–40)
BASOPHILS # BLD AUTO: 0 THOU/MM3 (ref 0–0.1)
BASOPHILS NFR BLD AUTO: 0 % (ref 0–3)
BILIRUB CONJ SERPL-MCNC: < 0.2 MG/DL (ref 0–0.3)
BILIRUB SERPL-MCNC: 0.2 MG/DL (ref 0.3–1.2)
BUN BLDP-MCNC: 16 MG/DL (ref 8–26)
CHLORIDE BLD-SCNC: 104 MEQ/L (ref 98–109)
CREAT BLD-MCNC: 0.7 MG/DL (ref 0.5–1.2)
EOSINOPHIL # BLD AUTO: 0 THOU/MM3 (ref 0–0.4)
EOSINOPHIL NFR BLD AUTO: 0 % (ref 0–4)
ERYTHROCYTE [DISTWIDTH] IN BLOOD BY AUTOMATED COUNT: 12 % (ref 11.5–14.5)
GFR SERPL CREATININE-BSD FRML MDRD: > 60 ML/MIN/1.73M2
GLUCOSE BLD-MCNC: 89 MG/DL (ref 70–108)
HCT VFR BLD AUTO: 42.8 % (ref 37–47)
HGB BLD-MCNC: 13.9 GM/DL (ref 12–16)
IMM GRANULOCYTES # BLD AUTO: 0.01 THOU/MM3 (ref 0–0.07)
IMM GRANULOCYTES NFR BLD AUTO: 0 %
IONIZED CALCIUM, WHOLE BLOOD: 1.23 MMOL/L (ref 1.12–1.32)
LYMPHOCYTES # BLD AUTO: 1.9 THOU/MM3 (ref 1–4.8)
LYMPHOCYTES NFR BLD AUTO: 36 % (ref 15–47)
MCH RBC QN AUTO: 31.3 PG (ref 26–33)
MCHC RBC AUTO-ENTMCNC: 32.5 GM/DL (ref 32.2–35.5)
MCV RBC AUTO: 96 FL (ref 81–99)
MONOCYTES # BLD AUTO: 0.5 THOU/MM3 (ref 0.4–1.3)
MONOCYTES NFR BLD AUTO: 10 % (ref 0–12)
NEUTROPHILS NFR BLD AUTO: 54 % (ref 43–75)
PLATELET # BLD AUTO: 248 THOU/MM3 (ref 130–400)
PMV BLD AUTO: 8.4 FL (ref 9.4–12.4)
POTASSIUM BLD-SCNC: 4.5 MEQ/L (ref 3.5–4.9)
PROT SERPL-MCNC: 7.7 G/DL (ref 6.1–8)
RBC # BLD AUTO: 4.44 MILL/MM3 (ref 4.2–5.4)
SEGMENTED NEUTROPHILS ABSOLUTE COUNT: 2.8 THOU/MM3 (ref 1.8–7.7)
SODIUM BLD-SCNC: 141 MEQ/L (ref 138–146)
TOTAL CO2, WHOLE BLOOD: 30 MEQ/L (ref 23–33)
WBC # BLD AUTO: 5.3 THOU/MM3 (ref 4.8–10.8)

## 2023-12-19 PROCEDURE — 99211 OFF/OP EST MAY X REQ PHY/QHP: CPT

## 2023-12-19 PROCEDURE — 85025 COMPLETE CBC W/AUTO DIFF WBC: CPT

## 2023-12-19 PROCEDURE — 80047 BASIC METABLC PNL IONIZED CA: CPT

## 2023-12-19 PROCEDURE — 80076 HEPATIC FUNCTION PANEL: CPT

## 2024-06-18 ENCOUNTER — HOSPITAL ENCOUNTER (OUTPATIENT)
Dept: INFUSION THERAPY | Age: 59
Discharge: HOME | End: 2024-06-18
Payer: COMMERCIAL

## 2024-06-18 ENCOUNTER — OFFICE VISIT (OUTPATIENT)
Dept: ONCOLOGY | Age: 59
End: 2024-06-18
Payer: COMMERCIAL

## 2024-06-18 VITALS
WEIGHT: 115.6 LBS | DIASTOLIC BLOOD PRESSURE: 67 MMHG | HEIGHT: 68 IN | RESPIRATION RATE: 16 BRPM | OXYGEN SATURATION: 100 % | TEMPERATURE: 98.6 F | SYSTOLIC BLOOD PRESSURE: 119 MMHG | HEART RATE: 69 BPM | BODY MASS INDEX: 17.52 KG/M2

## 2024-06-18 VITALS
TEMPERATURE: 98.6 F | HEART RATE: 69 BPM | DIASTOLIC BLOOD PRESSURE: 67 MMHG | OXYGEN SATURATION: 100 % | SYSTOLIC BLOOD PRESSURE: 119 MMHG | RESPIRATION RATE: 16 BRPM

## 2024-06-18 DIAGNOSIS — Z17.0 MALIGNANT NEOPLASM OF UPPER-OUTER QUADRANT OF RIGHT BREAST IN FEMALE, ESTROGEN RECEPTOR POSITIVE (HCC): ICD-10-CM

## 2024-06-18 DIAGNOSIS — Z17.0 MALIGNANT NEOPLASM OF UPPER-OUTER QUADRANT OF RIGHT BREAST IN FEMALE, ESTROGEN RECEPTOR POSITIVE (HCC): Primary | ICD-10-CM

## 2024-06-18 DIAGNOSIS — C50.411 MALIGNANT NEOPLASM OF UPPER-OUTER QUADRANT OF RIGHT BREAST IN FEMALE, ESTROGEN RECEPTOR POSITIVE (HCC): Primary | ICD-10-CM

## 2024-06-18 DIAGNOSIS — C50.411 MALIGNANT NEOPLASM OF UPPER-OUTER QUADRANT OF RIGHT BREAST IN FEMALE, ESTROGEN RECEPTOR POSITIVE (HCC): ICD-10-CM

## 2024-06-18 LAB
25(OH)D3 SERPL-MCNC: 34 NG/ML (ref 30–100)
BASOPHILS # BLD AUTO: 0 THOU/MM3 (ref 0–0.1)
BASOPHILS NFR BLD AUTO: 1 % (ref 0–3)
BUN BLDP-MCNC: 12 MG/DL (ref 8–26)
CHLORIDE BLD-SCNC: 106 MEQ/L (ref 98–109)
CREAT BLD-MCNC: 0.7 MG/DL (ref 0.5–1.2)
EOSINOPHIL # BLD AUTO: 0 THOU/MM3 (ref 0–0.4)
EOSINOPHIL NFR BLD AUTO: 1 % (ref 0–4)
ERYTHROCYTE [DISTWIDTH] IN BLOOD BY AUTOMATED COUNT: 12.5 % (ref 11.5–14.5)
GFR SERPL CREATININE-BSD FRML MDRD: > 90 ML/MIN/1.73M2
GLUCOSE BLD-MCNC: 93 MG/DL (ref 70–108)
HCT VFR BLD AUTO: 43.2 % (ref 37–47)
HGB BLD-MCNC: 13.9 GM/DL (ref 12–16)
IMM GRANULOCYTES # BLD AUTO: 0.01 THOU/MM3 (ref 0–0.07)
IMM GRANULOCYTES NFR BLD AUTO: 0 %
IONIZED CALCIUM, WHOLE BLOOD: 1.21 MMOL/L (ref 1.12–1.32)
LYMPHOCYTES # BLD AUTO: 1.8 THOU/MM3 (ref 1–4.8)
LYMPHOCYTES NFR BLD AUTO: 41 % (ref 15–47)
MCH RBC QN AUTO: 31.2 PG (ref 26–33)
MCHC RBC AUTO-ENTMCNC: 32.2 GM/DL (ref 32.2–35.5)
MCV RBC AUTO: 97 FL (ref 81–99)
MONOCYTES # BLD AUTO: 0.4 THOU/MM3 (ref 0.4–1.3)
MONOCYTES NFR BLD AUTO: 10 % (ref 0–12)
NEUTROPHILS ABSOLUTE: 2 THOU/MM3 (ref 1.8–7.7)
NEUTROPHILS NFR BLD AUTO: 48 % (ref 43–75)
PLATELET # BLD AUTO: 262 THOU/MM3 (ref 130–400)
PMV BLD AUTO: 8.4 FL (ref 9.4–12.4)
POTASSIUM BLD-SCNC: 4.5 MEQ/L (ref 3.5–4.9)
RBC # BLD AUTO: 4.45 MILL/MM3 (ref 4.2–5.4)
SODIUM BLD-SCNC: 144 MEQ/L (ref 138–146)
TOTAL CO2, WHOLE BLOOD: 31 MEQ/L (ref 23–33)
WBC # BLD AUTO: 4.3 THOU/MM3 (ref 4.8–10.8)

## 2024-06-18 PROCEDURE — 99211 OFF/OP EST MAY X REQ PHY/QHP: CPT

## 2024-06-18 PROCEDURE — 80076 HEPATIC FUNCTION PANEL: CPT

## 2024-06-18 PROCEDURE — 85025 COMPLETE CBC W/AUTO DIFF WBC: CPT

## 2024-06-18 PROCEDURE — 80047 BASIC METABLC PNL IONIZED CA: CPT

## 2024-06-18 PROCEDURE — 86300 IMMUNOASSAY TUMOR CA 15-3: CPT

## 2024-06-18 PROCEDURE — 99214 OFFICE O/P EST MOD 30 MIN: CPT | Performed by: INTERNAL MEDICINE

## 2024-06-18 PROCEDURE — 82306 VITAMIN D 25 HYDROXY: CPT

## 2024-06-18 PROCEDURE — 83615 LACTATE (LD) (LDH) ENZYME: CPT

## 2024-06-18 RX ORDER — LETROZOLE 2.5 MG/1
2.5 TABLET, FILM COATED ORAL DAILY
Qty: 90 TABLET | Refills: 3 | Status: SHIPPED | OUTPATIENT
Start: 2024-06-18

## 2024-06-18 NOTE — PATIENT INSTRUCTIONS
Order MRI of breast at [facility] for new spot found on right breast on exam today by me.   MDV in 4 weeks to go over results.   Notify plastic surgeon at [facility] about breast MRI appointment (request that he reviews)

## 2024-06-19 LAB
ALBUMIN SERPL BCG-MCNC: 4.5 G/DL (ref 3.5–5.1)
ALP SERPL-CCNC: 56 U/L (ref 38–126)
ALT SERPL W/O P-5'-P-CCNC: 19 U/L (ref 11–66)
AST SERPL-CCNC: 28 U/L (ref 5–40)
BILIRUB CONJ SERPL-MCNC: < 0.1 MG/DL (ref 0.1–13.8)
BILIRUB SERPL-MCNC: 0.2 MG/DL (ref 0.3–1.2)
LDH SERPL L TO P-CCNC: 276 U/L (ref 100–190)
PROT SERPL-MCNC: 7.5 G/DL (ref 6.1–8)

## 2024-06-19 NOTE — PROGRESS NOTES
positive, progestin receptor positive and HER-2/natty overexpression was negative by FISH.  The Ki-67 was 30%.  She was then seen by Dr. Da Silva at [facility].  CT scans of the chest abdomen and pelvis were completed as well as a bone scan.  These did not find any definitive evidence of distant metastatic disease.  There were several cyst and a hemangioma noted in the liver.  On September 20 of 2020 a follow-up breast MRI was completed which found a much larger mass than previously noted which abutted and may involve the lateral and posterior borders of the pectoralis minor muscle.  No left axillary or internal mammary chain lymphadenopathy was identified.  The patient was reviewed at the breast cancer multimodality conference at [facility] and received a recommendation of receiving neoadjuvant chemotherapy with dose dense Adriamycin/Cytoxan followed by a taxane.      Subjective/Interim Summary:   12/19/23-  Patient has been doing well overall.  She continues to have pain in the right lateral upper arm which has been slightly worse in the last month or so.  Denies pain in the breast or axillary areas.  No localized swellings.  She has been tolerating letrozole well.  No hot flashes or arthralgias.  She has been getting Reclast for osteopenia once a year.    Dr. Rodriguez 6/18/24-  This is a 58-year-old female with a history of recurrent right breast cancer status postchemotherapy, surgery, and repeat radiation on letrozole who comes in for her 6-month follow-up visit.  She does not have any new complaints.    Review of systems:  14 point system ROS was done and negative except for the positive pertinent history noted in subjective/ HPI.     Immunizations:  Immunization History   Administered Date(s) Administered    COVID-19, MODERNA BLUE border, Primary or Immunocompromised, (age 12y+), IM, 100 mcg/0.5mL 03/18/2021, 04/15/2021    COVID-19, MODERNA Bivalent, (age 12y+), IM, 50 mcg/0.5 mL 11/04/2022    COVID-19,

## 2024-06-20 ENCOUNTER — TELEPHONE (OUTPATIENT)
Dept: ONCOLOGY | Age: 59
End: 2024-06-20

## 2024-06-20 NOTE — TELEPHONE ENCOUNTER
Dilia called in and stated she was seen yesterday, but she was wondering about a test result that was elevated.  Her Lactate Dehydrogenase was high and was asking if that was something she should be concerned?  Dr. Harrington did not talk about result, Dilia was not sure if the  hadn't received results yet or if it was something that was not concerning.     Kera Reagan NP responded to question.  Stating that stress, inflammation, infection or injury can cause your Lactate Dehydrogenase elevate.  It is something doctors watch.  Kera TOWNSEND said, \" tell the patient what could cause her Lactate to be elevated and she should not be concerned at this time.\"

## 2024-06-21 LAB — CANCER AG27-29 SERPL-ACNC: 14 U/ML (ref 0–38)

## 2024-08-14 PROBLEM — E78.5 HYPERLIPIDEMIA: Status: ACTIVE | Noted: 2024-08-14

## 2024-08-14 PROBLEM — K21.9 GERD (GASTROESOPHAGEAL REFLUX DISEASE): Status: ACTIVE | Noted: 2021-02-25

## 2024-08-14 PROBLEM — C50.919 CARCINOMA OF BREAST (HCC): Status: ACTIVE | Noted: 2020-08-18

## 2024-08-14 PROBLEM — F41.9 MILD ANXIETY: Status: ACTIVE | Noted: 2024-08-14

## 2024-08-19 ENCOUNTER — OFFICE VISIT (OUTPATIENT)
Dept: SURGERY | Age: 59
End: 2024-08-19
Payer: COMMERCIAL

## 2024-08-19 VITALS
WEIGHT: 117.1 LBS | OXYGEN SATURATION: 98 % | SYSTOLIC BLOOD PRESSURE: 108 MMHG | HEART RATE: 62 BPM | TEMPERATURE: 98.2 F | HEIGHT: 68 IN | BODY MASS INDEX: 17.75 KG/M2 | DIASTOLIC BLOOD PRESSURE: 74 MMHG

## 2024-08-19 DIAGNOSIS — K43.9 VENTRAL HERNIA WITHOUT OBSTRUCTION OR GANGRENE: Primary | ICD-10-CM

## 2024-08-19 DIAGNOSIS — K43.6 INCARCERATED VENTRAL HERNIA: ICD-10-CM

## 2024-08-19 PROCEDURE — 99204 OFFICE O/P NEW MOD 45 MIN: CPT | Performed by: SURGERY

## 2024-08-19 NOTE — PATIENT INSTRUCTIONS
CT abd/pelvis scheduled at [facility] 8/20.  Arrive at 8:30 am.  NPO 4 hrs prior.

## 2024-08-19 NOTE — PROGRESS NOTES
had no prior hernia surgery.    Past Medical History  Past Medical History:   Diagnosis Date    Anxiety and depression     Cancer (HCC) 2010    right breast cancer    GERD (gastroesophageal reflux disease)        Past Surgical History  Past Surgical History:   Procedure Laterality Date    APPENDECTOMY      BREAST ENHANCEMENT SURGERY  07/19/2022    [facility]    BREAST IMPLANT REMOVAL  03/30/2022    [facility]    BREAST LUMPECTOMY Right 01/19/2010    [facility]-Dr. Potter    BREAST RECONSTRUCTION  09/12/2023    [facility]    COLONOSCOPY  10/30/2023    Dr. Gonzalez    ESOPHAGOGASTRODUODENOSCOPY  06/25/2024    Dr. Gonzalez    INSERTION OF BREAST TISSUE EXPANDER  01/03/2022    [facility]    ROTATOR CUFF REPAIR Left        Medications  Current Outpatient Medications   Medication Sig Dispense Refill    letrozole (FEMARA) 2.5 MG tablet Take 1 tablet by mouth daily 90 tablet 3    Calcium Citrate-Vitamin D (CALCIUM CITRATE PETITE/VIT D PO) Take 500 mg by mouth daily      Probiotic Product (PROBIOTIC BLEND PO) Take by mouth      escitalopram (LEXAPRO) 10 MG tablet Take 0.5 tablets by mouth daily       No current facility-administered medications for this visit.     Allergies     Allergies   Allergen Reactions    Other Itching and Rash     Patient developed rash and itching after applying triple antibiotic ointment and itching after applying Biopatch    Patient has known sensitivity to tape    Compazine [Prochlorperazine] Other (See Comments)     Had trouble swallowing     Zofran [Ondansetron Hcl] Other (See Comments)     Bad headaches       Family History  Family History   Problem Relation Age of Onset    Cancer Mother        SocialHistory  Social History     Socioeconomic History    Marital status:      Spouse name: Not on file    Number of children: Not on file    Years of education: Not on file    Highest education level: Not on file   Occupational History    Not on file   Tobacco Use    Smoking status: Never    Smokeless tobacco: Never   Substance

## 2024-08-20 ENCOUNTER — HOSPITAL ENCOUNTER (OUTPATIENT)
Dept: CT IMAGING | Age: 59
Discharge: HOME | End: 2024-08-20
Attending: RADIOLOGY

## 2024-08-20 DIAGNOSIS — Z00.6 ENCOUNTER FOR EXAMINATION FOR NORMAL COMPARISON OR CONTROL IN CLINICAL RESEARCH PROGRAM: ICD-10-CM

## 2024-08-22 ASSESSMENT — ENCOUNTER SYMPTOMS
SHORTNESS OF BREATH: 0
VOMITING: 0
BLOOD IN STOOL: 0
COLOR CHANGE: 0
ABDOMINAL DISTENTION: 1
TROUBLE SWALLOWING: 0
ABDOMINAL PAIN: 0
SORE THROAT: 0
VOICE CHANGE: 0
COUGH: 0
WHEEZING: 0
NAUSEA: 0

## 2024-08-28 ENCOUNTER — ANESTHESIA EVENT (OUTPATIENT)
Dept: OPERATING ROOM | Age: 59
End: 2024-08-28
Payer: COMMERCIAL

## 2024-08-28 ENCOUNTER — ANESTHESIA (OUTPATIENT)
Dept: OPERATING ROOM | Age: 59
End: 2024-08-28
Payer: COMMERCIAL

## 2024-08-28 ENCOUNTER — HOSPITAL ENCOUNTER (OUTPATIENT)
Age: 59
Setting detail: OUTPATIENT SURGERY
Discharge: HOME | End: 2024-08-28
Attending: SURGERY | Admitting: SURGERY
Payer: COMMERCIAL

## 2024-08-28 VITALS
DIASTOLIC BLOOD PRESSURE: 59 MMHG | HEIGHT: 68 IN | WEIGHT: 115.5 LBS | RESPIRATION RATE: 16 BRPM | OXYGEN SATURATION: 99 % | TEMPERATURE: 96.9 F | BODY MASS INDEX: 17.5 KG/M2 | SYSTOLIC BLOOD PRESSURE: 105 MMHG | HEART RATE: 78 BPM

## 2024-08-28 DIAGNOSIS — K43.9 VENTRAL HERNIA WITHOUT OBSTRUCTION OR GANGRENE: Primary | ICD-10-CM

## 2024-08-28 PROCEDURE — 2500000003 HC RX 250 WO HCPCS: Performed by: NURSE ANESTHETIST, CERTIFIED REGISTERED

## 2024-08-28 PROCEDURE — 2709999900 HC NON-CHARGEABLE SUPPLY: Performed by: SURGERY

## 2024-08-28 PROCEDURE — C1781 MESH (IMPLANTABLE): HCPCS | Performed by: SURGERY

## 2024-08-28 PROCEDURE — 6360000002 HC RX W HCPCS: Performed by: SURGERY

## 2024-08-28 PROCEDURE — 3700000001 HC ADD 15 MINUTES (ANESTHESIA): Performed by: SURGERY

## 2024-08-28 PROCEDURE — 7100000001 HC PACU RECOVERY - ADDTL 15 MIN: Performed by: SURGERY

## 2024-08-28 PROCEDURE — 3700000000 HC ANESTHESIA ATTENDED CARE: Performed by: SURGERY

## 2024-08-28 PROCEDURE — 7100000010 HC PHASE II RECOVERY - FIRST 15 MIN: Performed by: SURGERY

## 2024-08-28 PROCEDURE — 2580000003 HC RX 258: Performed by: SURGERY

## 2024-08-28 PROCEDURE — 3600000002 HC SURGERY LEVEL 2 BASE: Performed by: SURGERY

## 2024-08-28 PROCEDURE — 7100000000 HC PACU RECOVERY - FIRST 15 MIN: Performed by: SURGERY

## 2024-08-28 PROCEDURE — 6370000000 HC RX 637 (ALT 250 FOR IP): Performed by: SURGERY

## 2024-08-28 PROCEDURE — 6360000002 HC RX W HCPCS: Performed by: NURSE ANESTHETIST, CERTIFIED REGISTERED

## 2024-08-28 PROCEDURE — 3600000012 HC SURGERY LEVEL 2 ADDTL 15MIN: Performed by: SURGERY

## 2024-08-28 PROCEDURE — 7100000011 HC PHASE II RECOVERY - ADDTL 15 MIN: Performed by: SURGERY

## 2024-08-28 DEVICE — IMPLANTABLE DEVICE: Type: IMPLANTABLE DEVICE | Site: ABDOMEN | Status: FUNCTIONAL

## 2024-08-28 RX ORDER — SODIUM CHLORIDE 9 MG/ML
INJECTION, SOLUTION INTRAVENOUS PRN
Status: CANCELLED | OUTPATIENT
Start: 2024-08-28

## 2024-08-28 RX ORDER — SODIUM CHLORIDE 0.9 % (FLUSH) 0.9 %
5-40 SYRINGE (ML) INJECTION PRN
Status: CANCELLED | OUTPATIENT
Start: 2024-08-28

## 2024-08-28 RX ORDER — SODIUM CHLORIDE 9 MG/ML
INJECTION, SOLUTION INTRAVENOUS PRN
Status: DISCONTINUED | OUTPATIENT
Start: 2024-08-28 | End: 2024-08-28 | Stop reason: HOSPADM

## 2024-08-28 RX ORDER — ACETAMINOPHEN 325 MG/1
650 TABLET ORAL EVERY 4 HOURS PRN
Status: CANCELLED | OUTPATIENT
Start: 2024-08-28

## 2024-08-28 RX ORDER — LIDOCAINE HCL/PF 100 MG/5ML
SYRINGE (ML) INJECTION PRN
Status: DISCONTINUED | OUTPATIENT
Start: 2024-08-28 | End: 2024-08-28 | Stop reason: SDUPTHER

## 2024-08-28 RX ORDER — TRAMADOL HYDROCHLORIDE 50 MG/1
50 TABLET ORAL ONCE
Status: DISCONTINUED | OUTPATIENT
Start: 2024-08-28 | End: 2024-08-28 | Stop reason: HOSPADM

## 2024-08-28 RX ORDER — SODIUM CHLORIDE 0.9 % (FLUSH) 0.9 %
5-40 SYRINGE (ML) INJECTION EVERY 12 HOURS SCHEDULED
Status: DISCONTINUED | OUTPATIENT
Start: 2024-08-28 | End: 2024-08-28 | Stop reason: HOSPADM

## 2024-08-28 RX ORDER — SODIUM CHLORIDE 0.9 % (FLUSH) 0.9 %
5-40 SYRINGE (ML) INJECTION EVERY 12 HOURS SCHEDULED
Status: CANCELLED | OUTPATIENT
Start: 2024-08-28

## 2024-08-28 RX ORDER — DEXAMETHASONE SODIUM PHOSPHATE 4 MG/ML
8 INJECTION, SOLUTION INTRA-ARTICULAR; INTRALESIONAL; INTRAMUSCULAR; INTRAVENOUS; SOFT TISSUE ONCE
Status: COMPLETED | OUTPATIENT
Start: 2024-08-28 | End: 2024-08-28

## 2024-08-28 RX ORDER — FENTANYL CITRATE 50 UG/ML
INJECTION, SOLUTION INTRAMUSCULAR; INTRAVENOUS PRN
Status: DISCONTINUED | OUTPATIENT
Start: 2024-08-28 | End: 2024-08-28 | Stop reason: SDUPTHER

## 2024-08-28 RX ORDER — NALOXONE HYDROCHLORIDE 0.4 MG/ML
INJECTION, SOLUTION INTRAMUSCULAR; INTRAVENOUS; SUBCUTANEOUS PRN
Status: DISCONTINUED | OUTPATIENT
Start: 2024-08-28 | End: 2024-08-28 | Stop reason: HOSPADM

## 2024-08-28 RX ORDER — SUCRALFATE 1 G/1
1 TABLET ORAL 2 TIMES DAILY
COMMUNITY

## 2024-08-28 RX ORDER — PHENYLEPHRINE HCL IN 0.9% NACL 1 MG/10 ML
SYRINGE (ML) INTRAVENOUS PRN
Status: DISCONTINUED | OUTPATIENT
Start: 2024-08-28 | End: 2024-08-28 | Stop reason: SDUPTHER

## 2024-08-28 RX ORDER — DEXAMETHASONE SODIUM PHOSPHATE 10 MG/ML
INJECTION, EMULSION INTRAMUSCULAR; INTRAVENOUS PRN
Status: DISCONTINUED | OUTPATIENT
Start: 2024-08-28 | End: 2024-08-28 | Stop reason: SDUPTHER

## 2024-08-28 RX ORDER — ACETAMINOPHEN 500 MG
1000 TABLET ORAL ONCE
Status: COMPLETED | OUTPATIENT
Start: 2024-08-28 | End: 2024-08-28

## 2024-08-28 RX ORDER — TRAMADOL HYDROCHLORIDE 50 MG/1
100 TABLET ORAL EVERY 6 HOURS PRN
Status: DISCONTINUED | OUTPATIENT
Start: 2024-08-28 | End: 2024-08-28 | Stop reason: HOSPADM

## 2024-08-28 RX ORDER — BUPIVACAINE HYDROCHLORIDE 5 MG/ML
INJECTION, SOLUTION PERINEURAL PRN
Status: DISCONTINUED | OUTPATIENT
Start: 2024-08-28 | End: 2024-08-28 | Stop reason: ALTCHOICE

## 2024-08-28 RX ORDER — ROCURONIUM BROMIDE 10 MG/ML
INJECTION, SOLUTION INTRAVENOUS PRN
Status: DISCONTINUED | OUTPATIENT
Start: 2024-08-28 | End: 2024-08-28 | Stop reason: SDUPTHER

## 2024-08-28 RX ORDER — SODIUM CHLORIDE 0.9 % (FLUSH) 0.9 %
5-40 SYRINGE (ML) INJECTION PRN
Status: DISCONTINUED | OUTPATIENT
Start: 2024-08-28 | End: 2024-08-28 | Stop reason: HOSPADM

## 2024-08-28 RX ORDER — TRAMADOL HYDROCHLORIDE 50 MG/1
50 TABLET ORAL EVERY 4 HOURS PRN
Qty: 18 TABLET | Refills: 0 | Status: SHIPPED | OUTPATIENT
Start: 2024-08-28 | End: 2024-08-31

## 2024-08-28 RX ORDER — DIPHENHYDRAMINE HYDROCHLORIDE 50 MG/ML
12.5 INJECTION INTRAMUSCULAR; INTRAVENOUS
Status: DISCONTINUED | OUTPATIENT
Start: 2024-08-28 | End: 2024-08-28 | Stop reason: HOSPADM

## 2024-08-28 RX ORDER — KETOROLAC TROMETHAMINE 30 MG/ML
INJECTION, SOLUTION INTRAMUSCULAR; INTRAVENOUS PRN
Status: DISCONTINUED | OUTPATIENT
Start: 2024-08-28 | End: 2024-08-28 | Stop reason: SDUPTHER

## 2024-08-28 RX ORDER — IBUPROFEN 800 MG/1
800 TABLET, FILM COATED ORAL ONCE
Status: COMPLETED | OUTPATIENT
Start: 2024-08-28 | End: 2024-08-28

## 2024-08-28 RX ORDER — PROPOFOL 10 MG/ML
INJECTION, EMULSION INTRAVENOUS PRN
Status: DISCONTINUED | OUTPATIENT
Start: 2024-08-28 | End: 2024-08-28 | Stop reason: SDUPTHER

## 2024-08-28 RX ORDER — TRAMADOL HYDROCHLORIDE 50 MG/1
50 TABLET ORAL EVERY 6 HOURS PRN
Status: DISCONTINUED | OUTPATIENT
Start: 2024-08-28 | End: 2024-08-28 | Stop reason: HOSPADM

## 2024-08-28 RX ORDER — SODIUM CHLORIDE 9 MG/ML
INJECTION, SOLUTION INTRAVENOUS CONTINUOUS
Status: CANCELLED | OUTPATIENT
Start: 2024-08-28

## 2024-08-28 RX ORDER — MORPHINE SULFATE 4 MG/ML
4 INJECTION, SOLUTION INTRAMUSCULAR; INTRAVENOUS
Status: CANCELLED | OUTPATIENT
Start: 2024-08-28

## 2024-08-28 RX ORDER — MORPHINE SULFATE 2 MG/ML
2 INJECTION, SOLUTION INTRAMUSCULAR; INTRAVENOUS
Status: CANCELLED | OUTPATIENT
Start: 2024-08-28

## 2024-08-28 RX ORDER — FENTANYL CITRATE 50 UG/ML
50 INJECTION, SOLUTION INTRAMUSCULAR; INTRAVENOUS EVERY 5 MIN PRN
Status: DISCONTINUED | OUTPATIENT
Start: 2024-08-28 | End: 2024-08-28 | Stop reason: HOSPADM

## 2024-08-28 RX ORDER — SODIUM CHLORIDE 9 MG/ML
INJECTION, SOLUTION INTRAVENOUS CONTINUOUS
Status: DISCONTINUED | OUTPATIENT
Start: 2024-08-28 | End: 2024-08-28 | Stop reason: HOSPADM

## 2024-08-28 RX ADMIN — KETOROLAC TROMETHAMINE 30 MG: 30 INJECTION, SOLUTION INTRAMUSCULAR at 09:49

## 2024-08-28 RX ADMIN — SODIUM CHLORIDE: 9 INJECTION, SOLUTION INTRAVENOUS at 08:31

## 2024-08-28 RX ADMIN — Medication 100 MG: at 09:23

## 2024-08-28 RX ADMIN — SUGAMMADEX 200 MG: 100 INJECTION, SOLUTION INTRAVENOUS at 09:48

## 2024-08-28 RX ADMIN — ROCURONIUM BROMIDE 10 MG: 10 INJECTION INTRAVENOUS at 09:20

## 2024-08-28 RX ADMIN — IBUPROFEN 800 MG: 800 TABLET, FILM COATED ORAL at 08:31

## 2024-08-28 RX ADMIN — FENTANYL CITRATE 50 MCG: 50 INJECTION, SOLUTION INTRAMUSCULAR; INTRAVENOUS at 09:54

## 2024-08-28 RX ADMIN — FENTANYL CITRATE 50 MCG: 50 INJECTION, SOLUTION INTRAMUSCULAR; INTRAVENOUS at 09:23

## 2024-08-28 RX ADMIN — PROPOFOL 150 MG: 10 INJECTION, EMULSION INTRAVENOUS at 09:23

## 2024-08-28 RX ADMIN — ACETAMINOPHEN 1000 MG: 500 TABLET ORAL at 08:32

## 2024-08-28 RX ADMIN — WATER 2000 MG: 1 INJECTION INTRAMUSCULAR; INTRAVENOUS; SUBCUTANEOUS at 09:28

## 2024-08-28 RX ADMIN — DEXAMETHASONE SODIUM PHOSPHATE 8 MG: 4 INJECTION, SOLUTION INTRA-ARTICULAR; INTRALESIONAL; INTRAMUSCULAR; INTRAVENOUS; SOFT TISSUE at 08:33

## 2024-08-28 RX ADMIN — DEXAMETHASONE SODIUM PHOSPHATE 8 MG: 10 INJECTION, EMULSION INTRAMUSCULAR; INTRAVENOUS at 09:23

## 2024-08-28 RX ADMIN — Medication 50 MCG: at 09:28

## 2024-08-28 RX ADMIN — ROCURONIUM BROMIDE 30 MG: 10 INJECTION INTRAVENOUS at 09:23

## 2024-08-28 ASSESSMENT — PAIN SCALES - GENERAL
PAINLEVEL_OUTOF10: 2
PAINLEVEL_OUTOF10: 0
PAINLEVEL_OUTOF10: 2

## 2024-08-28 ASSESSMENT — PAIN - FUNCTIONAL ASSESSMENT
PAIN_FUNCTIONAL_ASSESSMENT: NONE - DENIES PAIN
PAIN_FUNCTIONAL_ASSESSMENT: 0-10

## 2024-08-28 NOTE — H&P
[facility]  History and Physical Update    Pt Name: Diane S Steinbrunner  MRN: [id number]  YOB: 1965  Date of evaluation: 8/28/2024    [x] I have examined the patient and reviewed the H&P/Consult and there are no changes to the patient or plans.    [] I have examined the patient and reviewed the H&P/Consult and have noted the following changes:        Sharon Potter MD MD  Electronically signed 8/28/2024 at 9:09 AM       Sharon Potter MD   General Surgery  New Patient Evaluation in Office  Pt Name: Diane S Steinbrunner  Date of Birth 1965   Today's Date: 8/19/2024  Medical Record Number: [id number]  Referring Provider: No ref. provider found  Primary Care Provider: Sharon Garcia APRN - CNP  Chief Complaint:       Chief Complaint   Patient presents with    Surgical Consult       NP self refer-Umbilical hernia         ASSESSMENT      Assessment  1. Ventral hernia without obstruction or gangrene    2. Incarcerated ventral hernia    3.  History of breast cancer        PLANS      Assessment & Plan  1.  CT scan abdomen and pelvis.  Abdominal pain, Nahed flap.  Epigastric mass.  Discussed differential diagnosis lipoma versus epigastric hernia, incarcerated  2.  Health records reviewed.  History of breast cancer  3.  Pending CT results patient will be scheduled for open incarcerated epigastric hernia repair with possible mesh versus excision of lipoma.  Techniques and risks of hernia surgery discussed with patient.  Surgical approaches discussed as well.  Defect seems to be quite small we will plan on proceeding with open repair with possible mesh.  Risks of mesh placement discussed as well as potential for recurrence of hernia with any approach.  4.  General Versus MAC anesthesia  5.  Techniques and risks of surgery discussed with patient.        SRINIVASAN Dobbs is a 58 y.o. year old female who is presenting today in the office for evaluation of an abdominal wall hernia.   She has a

## 2024-08-28 NOTE — ANESTHESIA PRE PROCEDURE
developed rash and itching after applying triple antibiotic ointment and itching after applying Biopatch    Patient has known sensitivity to tape   • Compazine [Prochlorperazine] Other (See Comments)     Had trouble swallowing    • Zofran [Ondansetron Hcl] Other (See Comments)     Bad headaches       Problem List:    Patient Active Problem List   Diagnosis Code   • Malignant neoplasm of upper-outer quadrant of right female breast (HCC) C50.411   • Encounter for chemotherapy management Z51.11   • Anemia associated with chemotherapy D64.81, T45.1X5A   • Leukocytosis D72.829   • Carcinoma of breast (HCC) C50.919   • GERD (gastroesophageal reflux disease) K21.9   • Hyperlipidemia E78.5   • Mild anxiety F41.9       Past Medical History:        Diagnosis Date   • Anxiety and depression    • Cancer (HCC) 2010    right breast cancer   • GERD (gastroesophageal reflux disease)        Past Surgical History:        Procedure Laterality Date   • APPENDECTOMY     • BREAST ENHANCEMENT SURGERY  07/19/2022    [facility]   • BREAST IMPLANT REMOVAL  03/30/2022    [facility]   • BREAST LUMPECTOMY Right 01/19/2010    [facility]-Dr. Potter   • BREAST RECONSTRUCTION  09/12/2023    [facility]   • COLONOSCOPY  10/30/2023    Dr. Gonzalez   • ESOPHAGOGASTRODUODENOSCOPY  06/25/2024    Dr. Gonzalez   • INSERTION OF BREAST TISSUE EXPANDER  01/03/2022    [facility]   • ROTATOR CUFF REPAIR Left        Social History:    Social History     Tobacco Use   • Smoking status: Never   • Smokeless tobacco: Never   Substance Use Topics   • Alcohol use: Yes     Comment: social                                Counseling given: Not Answered      Vital Signs (Current):   Vitals:    08/28/24 0803   BP: (!) 118/53   Pulse: 76   Resp: 20   Temp: (!) 96.4 °F (35.8 °C)   TempSrc: Tympanic   SpO2: 97%   Weight: 52.4 kg (115 lb 8 oz)   Height: 1.727 m (5' 8\")                                              BP Readings from Last 3 Encounters:   08/28/24 (!) 118/53   08/19/24 108/74   06/18/24 119/67  complications:   Airway: Mallampati: II          Dental:          Pulmonary:normal exam        (-) COPD and asthma                           Cardiovascular:        (-) hypertension, past MI, CAD and dysrhythmias                Neuro/Psych:   (+) depression/anxiety    (-) seizures and CVA           GI/Hepatic/Renal:   (+) GERD: well controlled     (-) liver disease and no renal disease       Endo/Other:        (-) diabetes mellitus, hypothyroidism, hyperthyroidism               Abdominal:             Vascular:     - DVT.      Other Findings:       Anesthesia Plan      general     ASA 2     (PIV. Additional access can be obtained after induction if needed. Standard ASA monitors. IV/PO opioids and other adjuncts as needed for pain control. PACU post op for recovery.    Possible anesthetics complications were discussed with the patient, including but not limited to: PONV, damage to the airway and surrounding structures (teeth, lips, gums, tongue, etc.), adverse reactions to medicine, cardiac complications (MI, CHF, arrhythmias, etc.), respiratory complications (post-op ventilation, respiratory failure, etc.), neurologic complications (nerve damage, stroke, seizure), and death. The patient was given the opportunity to ask questions and all questions were answered to the patient's satisfaction. The patient is in agreement with the anesthetic plan.  )  Induction: intravenous.      Anesthetic plan and risks discussed with patient and spouse.      Plan discussed with CRNA.                Je Wiley DO   8/28/2024

## 2024-08-28 NOTE — DISCHARGE INSTRUCTIONS
DR HADDAD'S DISCHARGE INSTRUCTIONS    Pt Name: Diane S Steinbrunner  Medical Record Number: [id number]  Today's Date: 8/28/2024    GENERAL ANESTHESIA OR SEDATION  1. Do not drive or operate hazardous machinery for 24 hours.  2. Do not make important business or personal decisions for 24 hours.  3. Do not drink alcoholic beverages or use tobacco for 24 hours.    ACTIVITY INSTRUCTIONS:  [] Rest today. Resume light to normal activity tomorrow.   [] You may resume normal activity tomorrow. Do not engage in strenuous activity that may place stress on your incision.  [x] Do not drive for 2-3 days and avoid heavy lifting, tugging, pullings greater than 10-20 lbs until seen in the office.      DIET INSTRUCTIONS:  []Begin with clear liquids. If not nauseated, may increase to a low-fat diet when you desire. Greasy and spicy foods are not advised.  [x]Regular diet as tolerated.  []Other:     MEDICATIONS  [x]Prescription sent with you to be used as directed.   []Lortab   [x]Tramadol   []Percocet   []Tylenol #3   []Oxycontin   Do not drink alcohol or drive while taking these medications. You may experience dizziness or drowsiness with these medications. You may also experience constipation which can be relieved with stool softners or laxatives.  [x]You may resume your daily prescription medication schedule unless otherwise specified.  [x]Do not take 325mg Aspirin or other blood thinners such as Coumadin or Plavix for 5 days.     WOUND/DRESSING INSTRUCTIONS:  Always ensure you and your care giver clean hands before and after caring for the wound.  [] Keep dressing clean and dry for 48 hours. Change when soiled or wet.      [] Allow steri-strips to fall off on their own.   [] Ice operative site for 20 minutes 4 times a day.     [x] May wash over incision in shower in 48 hours, but do not soak in a bath.  [] Take sitz bath for 20 minutes twice daily and after bowel movements.  [] Keep the abdominal binder in place during the day. May

## 2024-08-28 NOTE — PROGRESS NOTES
Pt admitted to [facility] room 10 and oriented to unit. SCD sleeves applied. Nares swabbed. Pt verbalized permission for first name, last initial and physicians name on white board. SDS board and discharge criteria explained, pt and family verbalized understanding. Pt denies thoughts of harming self or others. Call light in reach. Family at the bedside.  Kanu [phone]

## 2024-08-28 NOTE — PROGRESS NOTES
0956 - Pt arrived to PACU. Respirs unlabored on room air. Patient denies pain. VSS.     1006 - Pt resting with eyes closed.     1010 - Pt c/o being cold. Warming blanket applied.     1016 - Pt denies pain.     1026 - Pt meets criteria to discharge from PACU at this time. Respirations unlabored on room air. Denies pain. VSS. Transported to [facility] in stable condition.

## 2024-08-28 NOTE — OP NOTE
Operative Note      Patient: Diane S Steinbrunner  YOB: 1965  MRN: [id number]    Date of Procedure: 8/28/2024    Pre-Op Diagnosis Codes:      * Epigastric pain [R10.13]  Ventral hernia  Post-Op Diagnosis: Same       Procedure(s):  Open Epigastric Hernia Repair with Mesh . Hernia < 3 cm with mesh    Surgeon(s):  Sharon Potter MD    Assistant:   First Assistant: Je Kenny RN    Anesthesia: General    Estimated Blood Loss (mL): Minimal    Complications: None    Specimens:   * No specimens in log *    Implants:  Implant Name Type Inv. Item Serial No.  Lot No. LRB No. Used Action   PATCH IAN SM DIA1.7IN CIR W/ STRP [employer] ABS[employer] - [id number]  PATCH IAN SM DIA1.7IN CIR W/ STRP [employer]  BARD DAVOL-WD [id number] N/A 1 Implanted         Drains: * No LDAs found *    Findings:  Infection Present At Time Of Surgery (PATOS) (choose all levels that have infection present):  No infection present      Detailed Description of Procedure:   Patient was brought to the operating suite placed supine on the operating table pneumatic sequential compression devices on lower extremities.  She was given Ancef intravenously.  The upper midline hernia had been marked preoperatively confirmed by the patient.  After induction of general anesthesia patient's abdomen was prepped and draped.  Timeout was performed.    Skin incision was made in the midline over the palpable hernia.  Dissection was carried down to the fascia.  Hernia defect was identified it was about 2.8 cm in diameter.  It was easily reducible.  A small Ventralex mesh was placed in an underlay fashion though preperitoneal only.  It was secured circumferentially with interrupted Prolene suture and the fascia was closed over top primarily with interrupted Prolene suture.  Dermis and subcutaneous fat were closed with interrupted 3-0 Vicryl suture.  Skin was closed with running subcuticular barbed 4-0 Monocryl suture.  The entire  area was infiltrated with half percent Marcaine.  Skin glue was applied.  Sponge sharp and instrument counts were correct.  Patient was transported to the recovery area in stable condition.    Electronically signed by Sharon Potter MD on 8/28/2024 at 9:49 AM

## 2024-08-28 NOTE — ANESTHESIA POSTPROCEDURE EVALUATION
Department of Anesthesiology  Postprocedure Note    Patient: Diane S Steinbrunner  MRN: [id number]  YOB: 1965  Date of evaluation: 8/28/2024    Procedure Summary       Date: 08/28/24 Room / Location: [facility] OR 07 / [facility] OR    Anesthesia Start: 0915 Anesthesia Stop: 0957    Procedure: Open Epigastric Hernia Repair with Mesh (Abdomen) Diagnosis:       Epigastric pain      (Epigastric pain [R10.13])    Surgeons: Sharon Potter MD Responsible Provider: Je Wiley DO    Anesthesia Type: general ASA Status: 2            Anesthesia Type: No value filed.    Shashi Phase I: Shashi Score: 10    Shashi Phase II: Shashi Score: 10    Anesthesia Post Evaluation    Patient location during evaluation: PACU  Patient participation: complete - patient participated  Level of consciousness: awake and alert  Airway patency: patent  Nausea & Vomiting: no vomiting and no nausea  Cardiovascular status: hemodynamically stable  Respiratory status: acceptable and room air  Hydration status: stable  Pain management: adequate    No notable events documented.

## 2024-08-28 NOTE — PROGRESS NOTES
Pt returned to \[facility]\"" room 10. Vitals and assessment as charted. 0.9 infusing, @400ml to count from PACU. Pt has crackers and water. Family at the bedside. Pt and family verbalized understanding of discharge criteria and call light use. Call light in reach.

## 2024-08-29 ENCOUNTER — TELEPHONE (OUTPATIENT)
Dept: SURGERY | Age: 59
End: 2024-08-29

## 2024-08-29 NOTE — TELEPHONE ENCOUNTER
Post procedural phone call placed to patient. Patient utilizing prescribed medication with adequate control of pain. Patient able to readback education. Patients follow up appointment verified and confirmed in chart. Time spent for patient questions. Patient to follow up with office as needed.

## 2024-09-12 ENCOUNTER — OFFICE VISIT (OUTPATIENT)
Dept: SURGERY | Age: 59
End: 2024-09-12
Payer: COMMERCIAL

## 2024-09-12 VITALS
OXYGEN SATURATION: 94 % | BODY MASS INDEX: 17.58 KG/M2 | HEIGHT: 68 IN | SYSTOLIC BLOOD PRESSURE: 112 MMHG | RESPIRATION RATE: 18 BRPM | TEMPERATURE: 97.3 F | WEIGHT: 116 LBS | DIASTOLIC BLOOD PRESSURE: 72 MMHG

## 2024-09-12 DIAGNOSIS — K43.6 INCARCERATED VENTRAL HERNIA: Primary | ICD-10-CM

## 2024-09-12 DIAGNOSIS — Z09 POSTOP CHECK: ICD-10-CM

## 2024-09-12 PROCEDURE — 99213 OFFICE O/P EST LOW 20 MIN: CPT | Performed by: SURGERY

## 2024-09-12 ASSESSMENT — ENCOUNTER SYMPTOMS
ABDOMINAL PAIN: 0
COUGH: 0
SORE THROAT: 0
COLOR CHANGE: 0
SHORTNESS OF BREATH: 0
BLOOD IN STOOL: 0
WHEEZING: 0
NAUSEA: 0
VOMITING: 0

## 2024-09-14 ASSESSMENT — ENCOUNTER SYMPTOMS: ABDOMINAL DISTENTION: 0

## 2025-08-05 NOTE — PROGRESS NOTES
Blood pressure today is just slightly elevated but she is anxious seeing a new physician.  Will do stress hygienic measures and recheck her blood pressure on return.   Taxol Administration:  Taxol is filtered, use specific tubing for administration. If hypersensitivity reaction occurs, STOP TAXOL, maintain plain IV and notify physician for additional orders. Taxol is considered an irritant in low doses. High dose Taxol is considered a vesicant. Check with physician if infusion should be through a central line.    Neurological assessment completed pre administration [x]   Pre-medications administered as ordered [x]   Document baseline vital signs before administration [x]   For 3 hour Taxol: Document blood pressure, pulse, respiratory rate every 15 min times 1 hour after the start of Taxol []   For 1 hour Taxol: Document blood pressure, pulse, respiratory rate 15 min after the start of Taxol [x]   Document blood pressure, pulse, respiratory rate at the completion of Taxol [x]   Other:     []

## (undated) DEVICE — SUTURE PROL SZ 0 L30IN NONABSORBABLE BLU L36MM CT-1 1/2 CIR 8424H

## (undated) DEVICE — SUTURE VICRYL + SZ 2-0 L27IN ABSRB CLR CT-1 1/2 CIR TAPERCUT VCP259H

## (undated) DEVICE — SUTURE VICRYL + SZ 3-0 L27IN ABSRB UD L26MM SH 1/2 CIR VCP416H

## (undated) DEVICE — SPONGE GZ W4XL4IN COT 12 PLY TYP VII WVN C FLD DSGN STERILE

## (undated) DEVICE — GLOVE SURG 7 PF POLYMER COAT WHT STRL SIGN LTX ESSENTIAL LTX

## (undated) DEVICE — PENCIL SMK EVAC ALL IN 1 DSGN ENH VISIBILITY IMPROVED AIR

## (undated) DEVICE — BREAST HERNIA: Brand: MEDLINE INDUSTRIES, INC.

## (undated) DEVICE — SUTURE MONOCRYL SZ 4-0 L27IN ABSRB UD L19MM PS-2 1/2 CIR PRIM Y426H